# Patient Record
Sex: MALE | Race: WHITE | NOT HISPANIC OR LATINO | Employment: UNEMPLOYED | RURAL
[De-identification: names, ages, dates, MRNs, and addresses within clinical notes are randomized per-mention and may not be internally consistent; named-entity substitution may affect disease eponyms.]

---

## 2021-02-01 ENCOUNTER — OFFICE VISIT (OUTPATIENT)
Dept: FAMILY MEDICINE CLINIC | Facility: CLINIC | Age: 31
End: 2021-02-01

## 2021-02-01 VITALS
HEART RATE: 94 BPM | HEIGHT: 67 IN | TEMPERATURE: 98.4 F | DIASTOLIC BLOOD PRESSURE: 86 MMHG | WEIGHT: 143 LBS | OXYGEN SATURATION: 99 % | RESPIRATION RATE: 16 BRPM | SYSTOLIC BLOOD PRESSURE: 138 MMHG | BODY MASS INDEX: 22.44 KG/M2

## 2021-02-01 DIAGNOSIS — R60.9 EDEMA, UNSPECIFIED TYPE: Primary | ICD-10-CM

## 2021-02-01 PROCEDURE — 99203 OFFICE O/P NEW LOW 30 MIN: CPT | Performed by: FAMILY MEDICINE

## 2021-02-01 RX ORDER — ASPIRIN 325 MG
325 TABLET ORAL DAILY
COMMUNITY
End: 2022-10-11

## 2021-02-01 NOTE — PROGRESS NOTES
"Subjective   Faustino Almanzar is a 31 y.o. male.     Chief Complaint   Patient presents with   • Foot Swelling     x 1 week approx.        History of Present Illness     he is noting edema in feet and ankles for several days--its better now---deneis any cp or dyspnea      Current Outpatient Medications:   •  aspirin 325 MG tablet, Take 325 mg by mouth Daily., Disp: , Rfl:   Allergies   Allergen Reactions   • Sulfa Antibiotics Unknown - Low Severity       No past medical history on file.  Past Surgical History:   Procedure Laterality Date   • TONSILLECTOMY         Review of Systems   Constitutional: Negative.    HENT: Negative.    Eyes: Negative.    Respiratory: Negative.    Cardiovascular: Positive for leg swelling.   Gastrointestinal: Negative.    Endocrine: Negative.    Genitourinary: Negative.    Musculoskeletal: Negative.    Skin: Negative.    Allergic/Immunologic: Negative.    Neurological: Negative.    Hematological: Negative.    Psychiatric/Behavioral: Negative.        Objective  /86 (BP Location: Left arm)   Pulse 94   Temp 98.4 °F (36.9 °C)   Resp 16   Ht 170.2 cm (67\")   Wt 64.9 kg (143 lb)   SpO2 99%   BMI 22.40 kg/m²   Physical Exam  Vitals signs and nursing note reviewed.   Constitutional:       Appearance: Normal appearance. He is normal weight.   HENT:      Head: Normocephalic and atraumatic.      Nose: Nose normal.      Mouth/Throat:      Mouth: Mucous membranes are moist.      Pharynx: Oropharynx is clear.   Eyes:      Extraocular Movements: Extraocular movements intact.      Conjunctiva/sclera: Conjunctivae normal.      Pupils: Pupils are equal, round, and reactive to light.   Neck:      Musculoskeletal: Normal range of motion and neck supple.   Cardiovascular:      Rate and Rhythm: Normal rate and regular rhythm.      Pulses: Normal pulses.      Heart sounds: Normal heart sounds.   Pulmonary:      Effort: Pulmonary effort is normal.   Abdominal:      General: Abdomen is flat. Bowel " sounds are normal.   Musculoskeletal: Normal range of motion.   Skin:     General: Skin is warm.      Capillary Refill: Capillary refill takes less than 2 seconds.   Neurological:      General: No focal deficit present.      Mental Status: He is alert and oriented to person, place, and time. Mental status is at baseline.   Psychiatric:         Mood and Affect: Mood normal.         Behavior: Behavior normal.         Thought Content: Thought content normal.         Judgment: Judgment normal.         Assessment/Plan   Diagnoses and all orders for this visit:    1. Edema, unspecified type (Primary)  -     CBC w AUTO Differential  -     Comprehensive metabolic panel  -     TSH  -     Urinalysis without microscopic (no culture) - Urine, Clean Catch  -     Protein, Urine, 24 Hour - Urine, Clean Catch  -     Adult Transthoracic Echo Complete w/ Color, Spectral and Contrast if necessary per protocol  -     ECG 12 Lead                 Orders Placed This Encounter   Procedures   • Comprehensive metabolic panel   • TSH   • Urinalysis without microscopic (no culture) - Urine, Clean Catch   • Protein, Urine, 24 Hour - Urine, Clean Catch   • ECG 12 Lead     Order Specific Question:   Reason for Exam:     Answer:   edema, dyspnea   • Adult Transthoracic Echo Complete w/ Color, Spectral and Contrast if necessary per protocol     Order Specific Question:   Reason for exam?     Answer:   Heart Failure, Cardiomyopathy, or Sytemic or Pulmonary Hypertension   • CBC w AUTO Differential     Order Specific Question:   Manual Differential     Answer:   No       Follow up: 4 week(s)

## 2021-02-02 ENCOUNTER — APPOINTMENT (OUTPATIENT)
Dept: GENERAL RADIOLOGY | Facility: HOSPITAL | Age: 31
End: 2021-02-02

## 2021-02-02 ENCOUNTER — HOSPITAL ENCOUNTER (EMERGENCY)
Facility: HOSPITAL | Age: 31
Discharge: HOME | End: 2021-02-02
Admitting: EMERGENCY MEDICINE

## 2021-02-02 ENCOUNTER — TELEPHONE (OUTPATIENT)
Dept: FAMILY MEDICINE CLINIC | Facility: CLINIC | Age: 31
End: 2021-02-02

## 2021-02-02 VITALS
SYSTOLIC BLOOD PRESSURE: 146 MMHG | DIASTOLIC BLOOD PRESSURE: 85 MMHG | WEIGHT: 142 LBS | RESPIRATION RATE: 16 BRPM | HEART RATE: 72 BPM | OXYGEN SATURATION: 98 % | TEMPERATURE: 98.7 F | HEIGHT: 67 IN | BODY MASS INDEX: 22.29 KG/M2

## 2021-02-02 DIAGNOSIS — R80.9 PROTEINURIA, UNSPECIFIED TYPE: Primary | ICD-10-CM

## 2021-02-02 DIAGNOSIS — R31.9 HEMATURIA, UNSPECIFIED TYPE: Primary | ICD-10-CM

## 2021-02-02 DIAGNOSIS — R60.9 EDEMA, UNSPECIFIED TYPE: ICD-10-CM

## 2021-02-02 DIAGNOSIS — R60.0 PEDAL EDEMA: ICD-10-CM

## 2021-02-02 DIAGNOSIS — R80.9 PROTEINURIA, UNSPECIFIED TYPE: ICD-10-CM

## 2021-02-02 LAB
ALBUMIN SERPL-MCNC: 1.9 G/DL (ref 3.5–5.2)
ALBUMIN SERPL-MCNC: 2.4 G/DL (ref 3.5–5.2)
ALBUMIN/GLOB SERPL: 0.8 G/DL
ALBUMIN/GLOB SERPL: 1.1 G/DL
ALP SERPL-CCNC: 43 U/L (ref 39–117)
ALP SERPL-CCNC: 51 U/L (ref 39–117)
ALT SERPL W P-5'-P-CCNC: 10 U/L (ref 1–41)
ALT SERPL-CCNC: 16 U/L (ref 1–41)
ANION GAP SERPL CALCULATED.3IONS-SCNC: 10 MMOL/L (ref 5–15)
APPEARANCE UR: ABNORMAL
APTT PPP: 29.2 SECONDS (ref 24.1–35)
AST SERPL-CCNC: 18 U/L (ref 1–40)
AST SERPL-CCNC: 24 U/L (ref 1–40)
BACTERIA UR QL AUTO: ABNORMAL /HPF
BASOPHILS # BLD AUTO: 0.05 10*3/MM3 (ref 0–0.2)
BASOPHILS # BLD AUTO: 0.06 10*3/MM3 (ref 0–0.2)
BASOPHILS NFR BLD AUTO: 0.4 % (ref 0–1.5)
BASOPHILS NFR BLD AUTO: 0.4 % (ref 0–1.5)
BILIRUB SERPL-MCNC: 0.2 MG/DL (ref 0–1.2)
BILIRUB SERPL-MCNC: <0.2 MG/DL (ref 0–1.2)
BILIRUB UR QL STRIP: NEGATIVE
BILIRUB UR QL STRIP: NEGATIVE
BUN SERPL-MCNC: 10 MG/DL (ref 6–20)
BUN SERPL-MCNC: 10 MG/DL (ref 6–20)
BUN/CREAT SERPL: 13.7 (ref 7–25)
BUN/CREAT SERPL: 15.9 (ref 7–25)
CALCIUM SERPL-MCNC: 8.2 MG/DL (ref 8.6–10.5)
CALCIUM SPEC-SCNC: 7.4 MG/DL (ref 8.6–10.5)
CHLORIDE SERPL-SCNC: 103 MMOL/L (ref 98–107)
CHLORIDE SERPL-SCNC: 112 MMOL/L (ref 98–107)
CLARITY UR: CLEAR
CO2 SERPL-SCNC: 24 MMOL/L (ref 22–29)
CO2 SERPL-SCNC: 29.2 MMOL/L (ref 22–29)
COLOR UR: ABNORMAL
COLOR UR: YELLOW
CREAT SERPL-MCNC: 0.63 MG/DL (ref 0.76–1.27)
CREAT SERPL-MCNC: 0.73 MG/DL (ref 0.76–1.27)
DEPRECATED RDW RBC AUTO: 44.4 FL (ref 37–54)
EOSINOPHIL # BLD AUTO: 0.02 10*3/MM3 (ref 0–0.4)
EOSINOPHIL # BLD AUTO: 0.05 10*3/MM3 (ref 0–0.4)
EOSINOPHIL NFR BLD AUTO: 0.2 % (ref 0.3–6.2)
EOSINOPHIL NFR BLD AUTO: 0.4 % (ref 0.3–6.2)
ERYTHROCYTE [DISTWIDTH] IN BLOOD BY AUTOMATED COUNT: 12.9 % (ref 12.3–15.4)
ERYTHROCYTE [DISTWIDTH] IN BLOOD BY AUTOMATED COUNT: 13.2 % (ref 12.3–15.4)
GFR SERPL CREATININE-BSD FRML MDRD: 150 ML/MIN/1.73
GLOBULIN SER CALC-MCNC: 2.2 GM/DL
GLOBULIN UR ELPH-MCNC: 2.3 GM/DL
GLUCOSE BLDC GLUCOMTR-MCNC: 89 MG/DL (ref 70–130)
GLUCOSE SERPL-MCNC: 84 MG/DL (ref 65–99)
GLUCOSE SERPL-MCNC: 92 MG/DL (ref 65–99)
GLUCOSE UR QL: NEGATIVE
GLUCOSE UR STRIP-MCNC: NEGATIVE MG/DL
HCT VFR BLD AUTO: 45.9 % (ref 37.5–51)
HCT VFR BLD AUTO: 49.3 % (ref 37.5–51)
HGB BLD-MCNC: 16.1 G/DL (ref 13–17.7)
HGB BLD-MCNC: 16.9 G/DL (ref 13–17.7)
HGB UR QL STRIP.AUTO: ABNORMAL
HGB UR QL STRIP: ABNORMAL
HYALINE CASTS UR QL AUTO: ABNORMAL /LPF
IMM GRANULOCYTES # BLD AUTO: 0.06 10*3/MM3 (ref 0–0.05)
IMM GRANULOCYTES # BLD AUTO: 0.07 10*3/MM3 (ref 0–0.05)
IMM GRANULOCYTES NFR BLD AUTO: 0.5 % (ref 0–0.5)
IMM GRANULOCYTES NFR BLD AUTO: 0.5 % (ref 0–0.5)
INR PPP: 0.97 (ref 0.91–1.09)
KETONES UR QL STRIP: ABNORMAL
KETONES UR QL STRIP: ABNORMAL
LEUKOCYTE ESTERASE UR QL STRIP.AUTO: NEGATIVE
LEUKOCYTE ESTERASE UR QL STRIP: NEGATIVE
LIPASE SERPL-CCNC: 15 U/L (ref 13–60)
LYMPHOCYTES # BLD AUTO: 2.63 10*3/MM3 (ref 0.7–3.1)
LYMPHOCYTES # BLD AUTO: 2.68 10*3/MM3 (ref 0.7–3.1)
LYMPHOCYTES NFR BLD AUTO: 19.5 % (ref 19.6–45.3)
LYMPHOCYTES NFR BLD AUTO: 20.3 % (ref 19.6–45.3)
MCH RBC QN AUTO: 31.9 PG (ref 26.6–33)
MCH RBC QN AUTO: 31.9 PG (ref 26.6–33)
MCHC RBC AUTO-ENTMCNC: 34.3 G/DL (ref 31.5–35.7)
MCHC RBC AUTO-ENTMCNC: 35.1 G/DL (ref 31.5–35.7)
MCV RBC AUTO: 91.1 FL (ref 79–97)
MCV RBC AUTO: 93 FL (ref 79–97)
MONOCYTES # BLD AUTO: 0.71 10*3/MM3 (ref 0.1–0.9)
MONOCYTES # BLD AUTO: 0.87 10*3/MM3 (ref 0.1–0.9)
MONOCYTES NFR BLD AUTO: 5.3 % (ref 5–12)
MONOCYTES NFR BLD AUTO: 6.6 % (ref 5–12)
NEUTROPHILS # BLD AUTO: 9.99 10*3/MM3 (ref 1.7–7)
NEUTROPHILS NFR BLD AUTO: 72 % (ref 42.7–76)
NEUTROPHILS NFR BLD AUTO: 73.9 % (ref 42.7–76)
NEUTROPHILS NFR BLD AUTO: 9.53 10*3/MM3 (ref 1.7–7)
NITRITE UR QL STRIP: NEGATIVE
NITRITE UR QL STRIP: NEGATIVE
NRBC BLD AUTO-RTO: 0 /100 WBC (ref 0–0.2)
NRBC BLD AUTO-RTO: 0 /100 WBC (ref 0–0.2)
NT-PROBNP SERPL-MCNC: 26.1 PG/ML (ref 0–450)
PH UR STRIP.AUTO: 6.5 [PH] (ref 5–8)
PH UR STRIP: 6.5 [PH] (ref 5–8)
PLATELET # BLD AUTO: 295 10*3/MM3 (ref 140–450)
PLATELET # BLD AUTO: 334 10*3/MM3 (ref 140–450)
PMV BLD AUTO: 10.3 FL (ref 6–12)
POTASSIUM SERPL-SCNC: 4.2 MMOL/L (ref 3.5–5.2)
POTASSIUM SERPL-SCNC: 4.3 MMOL/L (ref 3.5–5.2)
PROT SERPL-MCNC: 4.2 G/DL (ref 6–8.5)
PROT SERPL-MCNC: 4.6 G/DL (ref 6–8.5)
PROT UR QL STRIP: ABNORMAL
PROT UR QL STRIP: ABNORMAL
PROTHROMBIN TIME: 12.5 SECONDS (ref 11.9–14.6)
RBC # BLD AUTO: 5.04 10*6/MM3 (ref 4.14–5.8)
RBC # BLD AUTO: 5.3 10*6/MM3 (ref 4.14–5.8)
RBC # UR: ABNORMAL /HPF
REF LAB TEST METHOD: ABNORMAL
SODIUM SERPL-SCNC: 140 MMOL/L (ref 136–145)
SODIUM SERPL-SCNC: 146 MMOL/L (ref 136–145)
SP GR UR STRIP: 1.02 (ref 1–1.03)
SP GR UR: ABNORMAL (ref 1–1.03)
SQUAMOUS #/AREA URNS HPF: ABNORMAL /HPF
TSH SERPL DL<=0.005 MIU/L-ACNC: 2.48 UIU/ML (ref 0.27–4.2)
UROBILINOGEN UR QL STRIP: ABNORMAL
UROBILINOGEN UR STRIP-MCNC: ABNORMAL MG/DL
WBC # BLD AUTO: 13.21 10*3/MM3 (ref 3.4–10.8)
WBC # BLD AUTO: 13.51 10*3/MM3 (ref 3.4–10.8)
WBC UR QL AUTO: ABNORMAL /HPF
YEAST URNS QL MICRO: ABNORMAL /HPF

## 2021-02-02 PROCEDURE — 81001 URINALYSIS AUTO W/SCOPE: CPT | Performed by: NURSE PRACTITIONER

## 2021-02-02 PROCEDURE — 83690 ASSAY OF LIPASE: CPT | Performed by: NURSE PRACTITIONER

## 2021-02-02 PROCEDURE — 83880 ASSAY OF NATRIURETIC PEPTIDE: CPT | Performed by: NURSE PRACTITIONER

## 2021-02-02 PROCEDURE — 80053 COMPREHEN METABOLIC PANEL: CPT | Performed by: NURSE PRACTITIONER

## 2021-02-02 PROCEDURE — 93010 ELECTROCARDIOGRAM REPORT: CPT | Performed by: INTERNAL MEDICINE

## 2021-02-02 PROCEDURE — 85730 THROMBOPLASTIN TIME PARTIAL: CPT | Performed by: NURSE PRACTITIONER

## 2021-02-02 PROCEDURE — 71045 X-RAY EXAM CHEST 1 VIEW: CPT

## 2021-02-02 PROCEDURE — 93005 ELECTROCARDIOGRAM TRACING: CPT | Performed by: NURSE PRACTITIONER

## 2021-02-02 PROCEDURE — 99283 EMERGENCY DEPT VISIT LOW MDM: CPT

## 2021-02-02 PROCEDURE — 85025 COMPLETE CBC W/AUTO DIFF WBC: CPT | Performed by: NURSE PRACTITIONER

## 2021-02-02 PROCEDURE — 85610 PROTHROMBIN TIME: CPT | Performed by: NURSE PRACTITIONER

## 2021-02-02 PROCEDURE — 82962 GLUCOSE BLOOD TEST: CPT

## 2021-02-02 RX ORDER — ONDANSETRON 4 MG/1
4 TABLET, ORALLY DISINTEGRATING ORAL EVERY 6 HOURS PRN
Qty: 12 TABLET | Refills: 0 | Status: SHIPPED | OUTPATIENT
Start: 2021-02-02 | End: 2021-02-05

## 2021-02-02 RX ORDER — SODIUM CHLORIDE 0.9 % (FLUSH) 0.9 %
10 SYRINGE (ML) INJECTION AS NEEDED
Status: DISCONTINUED | OUTPATIENT
Start: 2021-02-02 | End: 2021-02-02 | Stop reason: HOSPADM

## 2021-02-02 RX ADMIN — SODIUM CHLORIDE 1000 ML: 9 INJECTION, SOLUTION INTRAVENOUS at 13:45

## 2021-02-02 NOTE — DISCHARGE INSTRUCTIONS
Encourage fluid.  Limit sodium as previously instructed.  Tylenol as needed for pain. ESTHER hose when up.  Elevate legs when possible.  Follow up with PCP - call for appointment. Follow up with nephrology when scheduled. Return to ED if condition does not improve or worsens

## 2021-02-02 NOTE — ED PROVIDER NOTES
Subjective   31 yom presents with swelling to his feet bilaterally.   He states he was has been having swelling for a few days.  He denies SOB or CP.  He denies fever or cough.  He states he was seen by his PCP yesterday who did lab work and a urinalysis.  He states he was told he had blood and protein in his urine.  He states he feels like he drinks lots of water but still feels thirsty. He states he has had some nausea and no appetite.  He states his PCP told him to limit his sodium and they would refer him to a nephrologist.  He denies abdomen pain.  He denies v/d.  He denies dysuria.            Review of Systems   Constitutional: Negative for activity change, appetite change, fatigue and fever.   HENT: Negative for congestion, ear pain, facial swelling and sore throat.    Eyes: Negative for discharge and visual disturbance.   Respiratory: Negative for apnea, chest tightness, shortness of breath, wheezing and stridor.    Cardiovascular: Positive for leg swelling. Negative for chest pain and palpitations.   Gastrointestinal: Negative for abdominal distention, abdominal pain, diarrhea, nausea and vomiting.   Genitourinary: Negative for difficulty urinating and dysuria.   Musculoskeletal: Negative for arthralgias and myalgias.   Skin: Negative for rash and wound.   Neurological: Negative for dizziness and seizures.   Psychiatric/Behavioral: Negative for agitation and confusion.       Past Medical History:   Diagnosis Date   • Kidney stone        Allergies   Allergen Reactions   • Sulfa Antibiotics Unknown - Low Severity       Past Surgical History:   Procedure Laterality Date   • TONSILLECTOMY         Family History   Problem Relation Age of Onset   • Diabetes Mother    • Heart disease Father    • Diabetes Paternal Uncle        Social History     Socioeconomic History   • Marital status: Single     Spouse name: Not on file   • Number of children: Not on file   • Years of education: Not on file   • Highest education  "level: Not on file   Tobacco Use   • Smoking status: Former Smoker     Types: Cigarettes   • Tobacco comment: stopped 3 weeks ago.    Substance and Sexual Activity   • Alcohol use: Not Currently     Comment: occasional   • Drug use: Yes     Types: Marijuana     Comment: last use last week           Objective   Physical Exam  Constitutional:       Appearance: He is well-developed.   HENT:      Head: Normocephalic.   Eyes:      Pupils: Pupils are equal, round, and reactive to light.   Neck:      Musculoskeletal: Normal range of motion and neck supple.   Cardiovascular:      Rate and Rhythm: Normal rate and regular rhythm.      Heart sounds: No murmur.   Pulmonary:      Effort: Pulmonary effort is normal.      Breath sounds: Normal breath sounds.   Abdominal:      General: Bowel sounds are normal.      Palpations: Abdomen is soft.   Musculoskeletal: Normal range of motion.      Right lower le+ Edema present.      Left lower le+ Edema present.   Skin:     General: Skin is warm and dry.   Neurological:      Mental Status: He is alert and oriented to person, place, and time.         Procedures          No current facility-administered medications for this encounter.     Current Outpatient Medications:   •  aspirin 325 MG tablet, Take 325 mg by mouth Daily., Disp: , Rfl:   •  ondansetron ODT (ZOFRAN-ODT) 4 MG disintegrating tablet, Place 1 tablet on the tongue Every 6 (Six) Hours As Needed for Nausea or Vomiting for up to 3 days., Disp: 12 tablet, Rfl: 0    Vital signs:  /85 (BP Location: Left arm)   Pulse 72   Temp 98.7 °F (37.1 °C) (Oral)   Resp 16   Ht 170.2 cm (67\")   Wt 64.4 kg (142 lb)   SpO2 98%   BMI 22.24 kg/m²        ED LAB RESULTS:   Lab Results (last 24 hours)     Procedure Component Value Units Date/Time    CBC & Differential [[id number]]  (Abnormal) Collected: 21 142    Specimen: Blood Updated: 21 1443    Narrative:      The following orders were created for panel order CBC & " Differential.  Procedure                               Abnormality         Status                     ---------                               -----------         ------                     CBC Auto Differential[[id number]]         Abnormal            Final result                 Please view results for these tests on the individual orders.    Comprehensive Metabolic Panel [[id number]]  (Abnormal) Collected: 02/02/21 1429    Specimen: Blood Updated: 02/02/21 1501     Glucose 84 mg/dL      BUN 10 mg/dL      Creatinine 0.63 mg/dL      Sodium 146 mmol/L      Potassium 4.2 mmol/L      Chloride 112 mmol/L      CO2 24.0 mmol/L      Calcium 7.4 mg/dL      Total Protein 4.2 g/dL      Albumin 1.90 g/dL      ALT (SGPT) 10 U/L      AST (SGOT) 18 U/L      Alkaline Phosphatase 43 U/L      Total Bilirubin 0.2 mg/dL      eGFR Non African Amer 150 mL/min/1.73      Globulin 2.3 gm/dL      A/G Ratio 0.8 g/dL      BUN/Creatinine Ratio 15.9     Anion Gap 10.0 mmol/L     Narrative:      GFR Normal >60  Chronic Kidney Disease <60  Kidney Failure <15      Protime-INR [[id number]]  (Normal) Collected: 02/02/21 1429    Specimen: Blood Updated: 02/02/21 1457     Protime 12.5 Seconds      INR 0.97    aPTT [[id number]]  (Normal) Collected: 02/02/21 1429    Specimen: Blood Updated: 02/02/21 1457     PTT 29.2 seconds     Lipase [[id number]]  (Normal) Collected: 02/02/21 1429    Specimen: Blood Updated: 02/02/21 1456     Lipase 15 U/L     CBC Auto Differential [[id number]]  (Abnormal) Collected: 02/02/21 1429    Specimen: Blood Updated: 02/02/21 1443     WBC 13.21 10*3/mm3      RBC 5.04 10*6/mm3      Hemoglobin 16.1 g/dL      Hematocrit 45.9 %      MCV 91.1 fL      MCH 31.9 pg      MCHC 35.1 g/dL      RDW 13.2 %      RDW-SD 44.4 fl      MPV 10.3 fL      Platelets 295 10*3/mm3      Neutrophil % 72.0 %      Lymphocyte % 20.3 %      Monocyte % 6.6 %      Eosinophil % 0.2 %      Basophil % 0.4 %      Immature Grans % 0.5 %      Neutrophils, Absolute 9.53 10*3/mm3       Lymphocytes, Absolute 2.68 10*3/mm3      Monocytes, Absolute 0.87 10*3/mm3      Eosinophils, Absolute 0.02 10*3/mm3      Basophils, Absolute 0.05 10*3/mm3      Immature Grans, Absolute 0.06 10*3/mm3      nRBC 0.0 /100 WBC     BNP [[id number]]  (Normal) Collected: 02/02/21 1429    Specimen: Blood Updated: 02/02/21 1513     proBNP 26.1 pg/mL     Narrative:      Among patients with dyspnea, NT-proBNP is highly sensitive for the detection of acute congestive heart failure. In addition NT-proBNP of <300 pg/ml effectively rules out acute congestive heart failure with 99% negative predictive value.    Results may be falsely decreased if patient taking Biotin.      Urinalysis With Culture If Indicated - Urine, Clean Catch [[id number]]  (Abnormal) Collected: 02/02/21 1604    Specimen: Urine, Clean Catch Updated: 02/02/21 1626     Color, UA Yellow     Appearance, UA Clear     pH, UA 6.5     Specific Gravity, UA 1.018     Glucose, UA Negative     Ketones, UA 40 mg/dL (2+)     Bilirubin, UA Negative     Blood, UA Large (3+)     Protein, UA >=300 mg/dL (3+)     Leuk Esterase, UA Negative     Nitrite, UA Negative     Urobilinogen, UA 0.2 E.U./dL    Urinalysis, Microscopic Only - Urine, Clean Catch [[id number]]  (Abnormal) Collected: 02/02/21 1604    Specimen: Urine, Clean Catch Updated: 02/02/21 1642     RBC, UA 6-12 /HPF      WBC, UA 3-5 /HPF      Bacteria, UA Trace /HPF      Squamous Epithelial Cells, UA 0-2 /HPF      Yeast, UA Small/1+ Budding Yeast /HPF      Hyaline Casts, UA 3-6 /LPF      Methodology Manual Light Microscopy             IMAGING RESULTS  XR Chest 1 View   ED Interpretation   See results below      Final Result   1. No acute cardiopulmonary process.       This report was finalized on 02/02/2021 14:06 by Dr. Davis Levin MD.                     ED Course  ED Course as of Feb 03 1337   Tue Feb 02, 2021   1740 I discussed the patient's history, assessment and testing results with Dr So.  He will be dc'd home  to f/u with PCP and nephrology.  The patient voiced understanding of d'c instructions as well as testing results.     [KS]      ED Course User Index  [KS] Luan Torres APRN                                           MDM  Number of Diagnoses or Management Options  Pedal edema: new and requires workup  Proteinuria, unspecified type: new and requires workup     Amount and/or Complexity of Data Reviewed  Clinical lab tests: ordered and reviewed  Tests in the radiology section of CPT®: ordered and reviewed  Decide to obtain previous medical records or to obtain history from someone other than the patient: yes  Discuss the patient with other providers: yes  Independent visualization of images, tracings, or specimens: yes    Risk of Complications, Morbidity, and/or Mortality  Presenting problems: minimal  Diagnostic procedures: minimal  Management options: minimal    Patient Progress  Patient progress: stable      Final diagnoses:   Proteinuria, unspecified type   Pedal edema            Luan Torres, [facility]  02/03/21 0083

## 2021-02-03 LAB
QT INTERVAL: 368 MS
QTC INTERVAL: 440 MS

## 2021-02-05 ENCOUNTER — OFFICE VISIT (OUTPATIENT)
Dept: FAMILY MEDICINE CLINIC | Facility: CLINIC | Age: 31
End: 2021-02-05

## 2021-02-05 VITALS
TEMPERATURE: 98.5 F | HEIGHT: 67 IN | SYSTOLIC BLOOD PRESSURE: 138 MMHG | RESPIRATION RATE: 16 BRPM | WEIGHT: 143 LBS | DIASTOLIC BLOOD PRESSURE: 80 MMHG | HEART RATE: 70 BPM | BODY MASS INDEX: 22.44 KG/M2

## 2021-02-05 DIAGNOSIS — R60.9 EDEMA, UNSPECIFIED TYPE: Primary | ICD-10-CM

## 2021-02-05 DIAGNOSIS — R31.1 BENIGN ESSENTIAL MICROSCOPIC HEMATURIA: ICD-10-CM

## 2021-02-05 PROCEDURE — 99213 OFFICE O/P EST LOW 20 MIN: CPT | Performed by: FAMILY MEDICINE

## 2021-02-05 NOTE — PROGRESS NOTES
"Subjective   Faustino Almanzar is a 31 y.o. male.     Chief Complaint   Patient presents with   • Follow-up     hospital er        History of Present Illness     his edema is better---denies any sob or  cp--he awaiting the appt with nephrolgy      Current Outpatient Medications:   •  aspirin 325 MG tablet, Take 325 mg by mouth Daily., Disp: , Rfl:   •  ondansetron ODT (ZOFRAN-ODT) 4 MG disintegrating tablet, Place 1 tablet on the tongue Every 6 (Six) Hours As Needed for Nausea or Vomiting for up to 3 days., Disp: 12 tablet, Rfl: 0  Allergies   Allergen Reactions   • Sulfa Antibiotics Unknown - Low Severity       Past Medical History:   Diagnosis Date   • Kidney stone      Past Surgical History:   Procedure Laterality Date   • TONSILLECTOMY         Review of Systems   Constitutional: Negative.    HENT: Negative.    Eyes: Negative.    Respiratory: Negative.    Cardiovascular: Positive for leg swelling.   Gastrointestinal: Negative.    Endocrine: Negative.    Genitourinary: Negative.    Musculoskeletal: Negative.    Skin: Negative.    Allergic/Immunologic: Negative.    Neurological: Negative.    Hematological: Negative.    Psychiatric/Behavioral: Negative.        Objective  /80   Pulse 70   Temp 98.5 °F (36.9 °C)   Resp 16   Ht 170.2 cm (67.01\")   Wt 64.9 kg (143 lb)   BMI 22.39 kg/m²   Physical Exam  Vitals signs and nursing note reviewed.   Constitutional:       Appearance: He is normal weight.   HENT:      Head: Normocephalic and atraumatic.      Right Ear: Ear canal normal.      Left Ear: Ear canal normal.      Mouth/Throat:      Mouth: Mucous membranes are moist.   Eyes:      Conjunctiva/sclera: Conjunctivae normal.      Pupils: Pupils are equal, round, and reactive to light.   Neck:      Musculoskeletal: Normal range of motion and neck supple.   Cardiovascular:      Rate and Rhythm: Normal rate and regular rhythm.      Pulses: Normal pulses.      Heart sounds: Normal heart sounds.   Pulmonary:      " Effort: Pulmonary effort is normal.      Breath sounds: Normal breath sounds.   Abdominal:      General: Abdomen is flat. Bowel sounds are normal.      Palpations: Abdomen is soft.   Musculoskeletal: Normal range of motion.   Skin:     General: Skin is warm.      Capillary Refill: Capillary refill takes less than 2 seconds.   Neurological:      General: No focal deficit present.      Mental Status: He is alert and oriented to person, place, and time. Mental status is at baseline.   Psychiatric:         Mood and Affect: Mood normal.         Behavior: Behavior normal.         Thought Content: Thought content normal.         Judgment: Judgment normal.         Assessment/Plan   Diagnoses and all orders for this visit:    1. Edema, unspecified type (Primary)    2. Benign essential microscopic hematuria      He awaiting to see nephrology           No orders of the defined types were placed in this encounter.      Follow up: 3 month(s)

## 2021-02-06 LAB
PROT 24H UR-MRATE: ABNORMAL MG/24HOURS (ref 0–150)
PROT UR-MCNC: 1540 MG/DL

## 2021-03-01 ENCOUNTER — OFFICE VISIT (OUTPATIENT)
Dept: FAMILY MEDICINE CLINIC | Facility: CLINIC | Age: 31
End: 2021-03-01

## 2021-03-01 VITALS
TEMPERATURE: 98 F | OXYGEN SATURATION: 98 % | SYSTOLIC BLOOD PRESSURE: 124 MMHG | RESPIRATION RATE: 16 BRPM | DIASTOLIC BLOOD PRESSURE: 78 MMHG | HEART RATE: 78 BPM | HEIGHT: 67 IN | WEIGHT: 152 LBS | BODY MASS INDEX: 23.86 KG/M2

## 2021-03-01 DIAGNOSIS — R60.9 EDEMA, UNSPECIFIED TYPE: Primary | ICD-10-CM

## 2021-03-01 DIAGNOSIS — R80.9 PROTEINURIA, UNSPECIFIED TYPE: ICD-10-CM

## 2021-03-01 PROCEDURE — 99213 OFFICE O/P EST LOW 20 MIN: CPT | Performed by: FAMILY MEDICINE

## 2021-03-01 RX ORDER — LOSARTAN POTASSIUM 50 MG/1
TABLET ORAL
COMMUNITY
Start: 2021-02-10

## 2021-03-01 NOTE — PROGRESS NOTES
"Subjective   Faustino Almanzar is a 31 y.o. male.     Chief Complaint   Patient presents with   • Hypertension   • Leg Swelling       History of Present Illness     he is undergong eval for proteinuria by renal service      Current Outpatient Medications:   •  aspirin 325 MG tablet, Take 325 mg by mouth Daily., Disp: , Rfl:   •  losartan (COZAAR) 50 MG tablet, , Disp: , Rfl:   Allergies   Allergen Reactions   • Sulfa Antibiotics Unknown - Low Severity       Past Medical History:   Diagnosis Date   • Kidney stone      Past Surgical History:   Procedure Laterality Date   • TONSILLECTOMY         Review of Systems   Constitutional: Negative.    HENT: Negative.    Eyes: Negative.    Respiratory: Negative.    Cardiovascular: Negative.    Gastrointestinal: Negative.    Endocrine: Negative.    Genitourinary: Negative.    Musculoskeletal: Negative.    Skin: Negative.    Allergic/Immunologic: Negative.    Neurological: Negative.    Hematological: Negative.    Psychiatric/Behavioral: Negative.        Objective  /78 (BP Location: Left arm)   Pulse 78   Temp 98 °F (36.7 °C)   Resp 16   Ht 170.2 cm (67\")   Wt 68.9 kg (152 lb)   SpO2 98%   BMI 23.81 kg/m²   Physical Exam  Vitals signs and nursing note reviewed.   Constitutional:       Appearance: He is normal weight.   HENT:      Head: Normocephalic and atraumatic.      Nose: Nose normal.      Mouth/Throat:      Mouth: Mucous membranes are moist.   Eyes:      Extraocular Movements: Extraocular movements intact.      Pupils: Pupils are equal, round, and reactive to light.   Neck:      Musculoskeletal: Normal range of motion.   Cardiovascular:      Rate and Rhythm: Normal rate and regular rhythm.      Pulses: Normal pulses.   Pulmonary:      Effort: Pulmonary effort is normal.      Breath sounds: Normal breath sounds.   Abdominal:      General: Abdomen is flat. Bowel sounds are normal.   Musculoskeletal: Normal range of motion.   Skin:     General: Skin is warm and " dry.      Capillary Refill: Capillary refill takes less than 2 seconds.   Neurological:      General: No focal deficit present.      Mental Status: He is alert and oriented to person, place, and time. Mental status is at baseline.   Psychiatric:         Mood and Affect: Mood normal.         Behavior: Behavior normal.         Thought Content: Thought content normal.         Judgment: Judgment normal.         Assessment/Plan   Diagnoses and all orders for this visit:    1. Edema, unspecified type (Primary)    2. Proteinuria, unspecified type        He will keep appt with nephrology         No orders of the defined types were placed in this encounter.      Follow up: 6 month(s)

## 2021-03-01 NOTE — PATIENT INSTRUCTIONS
Problem List: Reviewed (You)  Medical History: Reviewed (You)  Surgical History: Reviewed (You)  Tobacco History: Reviewed (You)  Family History: Reviewed (You)      Patient Instructions (F3 to enlarge) Go to Clinical References

## 2021-03-04 ENCOUNTER — HOSPITAL ENCOUNTER (OUTPATIENT)
Dept: CARDIOLOGY | Facility: HOSPITAL | Age: 31
Discharge: HOME | End: 2021-03-04

## 2021-03-04 ENCOUNTER — HOSPITAL ENCOUNTER (OUTPATIENT)
Dept: ULTRASOUND IMAGING | Facility: HOSPITAL | Age: 31
Discharge: HOME | End: 2021-03-04

## 2021-03-04 VITALS
SYSTOLIC BLOOD PRESSURE: 129 MMHG | BODY MASS INDEX: 23.84 KG/M2 | HEIGHT: 67 IN | DIASTOLIC BLOOD PRESSURE: 72 MMHG | WEIGHT: 151.9 LBS

## 2021-03-04 PROCEDURE — 93306 TTE W/DOPPLER COMPLETE: CPT | Performed by: INTERNAL MEDICINE

## 2021-03-04 PROCEDURE — 93306 TTE W/DOPPLER COMPLETE: CPT

## 2021-03-04 PROCEDURE — 76775 US EXAM ABDO BACK WALL LIM: CPT

## 2021-03-08 LAB
BH CV ECHO MEAS - % IVS THICK: 31.6 %
BH CV ECHO MEAS - % LVPW THICK: 64.9 %
BH CV ECHO MEAS - AO ACC TIME: 2.2 SEC
BH CV ECHO MEAS - AO MAX PG (FULL): 0.3 MMHG
BH CV ECHO MEAS - AO MAX PG: 0.84 MMHG
BH CV ECHO MEAS - AO V2 MAX: 45.8 CM/SEC
BH CV ECHO MEAS - AO V2 VTI: 34.5 CM
BH CV ECHO MEAS - BSA(HAYCOCK): 1.8 M^2
BH CV ECHO MEAS - BSA: 1.8 M^2
BH CV ECHO MEAS - BZI_BMI: 23.8 KILOGRAMS/M^2
BH CV ECHO MEAS - BZI_METRIC_HEIGHT: 170.2 CM
BH CV ECHO MEAS - BZI_METRIC_WEIGHT: 68.9 KG
BH CV ECHO MEAS - EDV(MOD-SP4): 102.9 ML
BH CV ECHO MEAS - EF(MOD-SP4): 46 %
BH CV ECHO MEAS - ESV(MOD-SP4): 55.6 ML
BH CV ECHO MEAS - IVS/LVPW: 0.95
BH CV ECHO MEAS - IVSD: 0.86 CM
BH CV ECHO MEAS - IVSS: 1.1 CM
BH CV ECHO MEAS - LAT PEAK E' VEL: 16.8 CM/SEC
BH CV ECHO MEAS - LV DIASTOLIC VOL/BSA (35-75): 57.2 ML/M^2
BH CV ECHO MEAS - LV MAX PG: 0.54 MMHG
BH CV ECHO MEAS - LV MEAN PG: 4.8 MMHG
BH CV ECHO MEAS - LV SYSTOLIC VOL/BSA (12-30): 30.9 ML/M^2
BH CV ECHO MEAS - LV V1 MAX: 36.7 CM/SEC
BH CV ECHO MEAS - LV V1 MEAN: 104 CM/SEC
BH CV ECHO MEAS - LV V1 VTI: 30.2 CM
BH CV ECHO MEAS - LVD MAJOR: 8 CM
BH CV ECHO MEAS - LVET: 0.31 SEC
BH CV ECHO MEAS - LVIDD: 4.6 CM
BH CV ECHO MEAS - LVIDS: 3.5 CM
BH CV ECHO MEAS - LVOT AREA (M): 2.6 CM^2
BH CV ECHO MEAS - LVPWD: 0.91 CM
BH CV ECHO MEAS - LVPWS: 1.5 CM
BH CV ECHO MEAS - LVS MAJOR: 6.5 CM
BH CV ECHO MEAS - MED PEAK E' VEL: 14.2 CM/SEC
BH CV ECHO MEAS - MV A MAX VEL: 46 CM/SEC
BH CV ECHO MEAS - MV DEC SLOPE: 668.5 CM/SEC^2
BH CV ECHO MEAS - MV DEC TIME: 186 MSEC
BH CV ECHO MEAS - MV E MAX VEL: 124.7 CM/SEC
BH CV ECHO MEAS - MV E/A: 2.7
BH CV ECHO MEAS - SI(MOD-SP4): 26.3 ML/M^2
BH CV ECHO MEAS - SV(MOD-SP4): 47.3 ML
BH CV ECHO MEASUREMENTS AVERAGE E/E' RATIO: 8.05
LEFT ATRIUM VOLUME INDEX: 29.8 ML/M2
LEFT ATRIUM VOLUME: 53.6 CM3
MAXIMAL PREDICTED HEART RATE: 190 BPM
STRESS TARGET HR: 162 BPM

## 2021-08-11 LAB
ANION GAP SERPL CALCULATED.3IONS-SCNC: 12 MMOL/L (ref 7–19)
BUN BLDV-MCNC: 19 MG/DL (ref 6–20)
CALCIUM SERPL-MCNC: 8.4 MG/DL (ref 8.6–10)
CHLORIDE BLD-SCNC: 90 MMOL/L (ref 98–111)
CO2: 31 MMOL/L (ref 22–29)
CREAT SERPL-MCNC: 0.9 MG/DL (ref 0.5–1.2)
CREATININE URINE: 118.9 MG/DL (ref 4.2–622)
GFR AFRICAN AMERICAN: >59
GFR NON-AFRICAN AMERICAN: >60
GLUCOSE BLD-MCNC: 108 MG/DL (ref 74–109)
POTASSIUM SERPL-SCNC: 3.1 MMOL/L (ref 3.5–5)
PROTEIN PROTEIN: >600 MG/DL (ref 15–45)
SODIUM BLD-SCNC: 133 MMOL/L (ref 136–145)

## 2021-08-30 ENCOUNTER — OFFICE VISIT (OUTPATIENT)
Dept: FAMILY MEDICINE CLINIC | Facility: CLINIC | Age: 31
End: 2021-08-30

## 2021-08-30 VITALS
OXYGEN SATURATION: 97 % | HEART RATE: 94 BPM | DIASTOLIC BLOOD PRESSURE: 62 MMHG | HEIGHT: 67 IN | BODY MASS INDEX: 23.54 KG/M2 | WEIGHT: 150 LBS | SYSTOLIC BLOOD PRESSURE: 112 MMHG | RESPIRATION RATE: 16 BRPM

## 2021-08-30 DIAGNOSIS — N28.9 NEPHROPATHY: Primary | ICD-10-CM

## 2021-08-30 PROCEDURE — 99213 OFFICE O/P EST LOW 20 MIN: CPT | Performed by: FAMILY MEDICINE

## 2021-08-30 RX ORDER — BUMETANIDE 1 MG/1
TABLET ORAL
COMMUNITY
Start: 2021-07-02

## 2021-08-30 RX ORDER — PREDNISONE 20 MG/1
TABLET ORAL
COMMUNITY
Start: 2021-08-11 | End: 2022-08-23

## 2021-08-30 RX ORDER — CYCLOSPORINE 100 MG/1
CAPSULE, LIQUID FILLED ORAL
COMMUNITY
Start: 2021-07-20 | End: 2022-10-11

## 2021-08-30 RX ORDER — CYCLOSPORINE 50 MG/1
CAPSULE, LIQUID FILLED ORAL
COMMUNITY
Start: 2021-07-16 | End: 2022-10-11

## 2021-08-30 NOTE — PROGRESS NOTES
"Subjective   Faustino Almanzar is a 31 y.o. male.     Chief Complaint   Patient presents with   • Edema     6 mo f/u      History of Present Illness     he is under the care of nephrology for nephropathy      Current Outpatient Medications:   •  aspirin 325 MG tablet, Take 325 mg by mouth Daily., Disp: , Rfl:   •  bumetanide (BUMEX) 1 MG tablet, , Disp: , Rfl:   •  cycloSPORINE modified (NEORAL) 100 MG capsule, , Disp: , Rfl:   •  cycloSPORINE modified (NEORAL) 50 MG capsule, , Disp: , Rfl:   •  losartan (COZAAR) 50 MG tablet, , Disp: , Rfl:   •  predniSONE (DELTASONE) 20 MG tablet, , Disp: , Rfl:   Allergies   Allergen Reactions   • Sulfa Antibiotics Unknown - Low Severity       Past Medical History:   Diagnosis Date   • Kidney stone      Past Surgical History:   Procedure Laterality Date   • TONSILLECTOMY         Review of Systems   Constitutional: Negative.    HENT: Negative.    Eyes: Negative.    Respiratory: Negative.    Cardiovascular: Negative.    Gastrointestinal: Negative.    Endocrine: Negative.    Genitourinary: Negative.    Musculoskeletal: Negative.    Skin: Negative.    Allergic/Immunologic: Negative.    Neurological: Negative.    Hematological: Negative.    Psychiatric/Behavioral: Negative.        Objective  /62   Pulse 94   Resp 16   Ht 170.2 cm (67\")   Wt 68 kg (150 lb)   SpO2 97%   BMI 23.49 kg/m²   Physical Exam  Vitals and nursing note reviewed.   Constitutional:       Appearance: Normal appearance. He is normal weight.   HENT:      Head: Normocephalic.      Nose: Nose normal.      Mouth/Throat:      Mouth: Mucous membranes are moist.   Eyes:      Extraocular Movements: Extraocular movements intact.      Pupils: Pupils are equal, round, and reactive to light.   Cardiovascular:      Rate and Rhythm: Normal rate and regular rhythm.      Pulses: Normal pulses.      Heart sounds: Normal heart sounds.   Pulmonary:      Effort: Pulmonary effort is normal.      Breath sounds: Normal breath " sounds.   Abdominal:      General: Abdomen is flat. Bowel sounds are normal.   Musculoskeletal:         General: Normal range of motion.      Cervical back: Normal range of motion.   Skin:     General: Skin is warm and dry.      Capillary Refill: Capillary refill takes less than 2 seconds.   Neurological:      General: No focal deficit present.      Mental Status: He is alert and oriented to person, place, and time. Mental status is at baseline.   Psychiatric:         Mood and Affect: Mood normal.         Behavior: Behavior normal.         Thought Content: Thought content normal.         Judgment: Judgment normal.         Assessment/Plan   Diagnoses and all orders for this visit:    1. Nephropathy (Primary)      He will continue follow up with nephrology           No orders of the defined types were placed in this encounter.      Follow up: 6 month(s)

## 2021-09-07 LAB
ALBUMIN SERPL-MCNC: 3.8 G/DL (ref 3.5–5.2)
ALP BLD-CCNC: 63 U/L (ref 40–130)
ALT SERPL-CCNC: 25 U/L (ref 5–41)
ANION GAP SERPL CALCULATED.3IONS-SCNC: 12 MMOL/L (ref 7–19)
AST SERPL-CCNC: 13 U/L (ref 5–40)
BILIRUB SERPL-MCNC: 0.3 MG/DL (ref 0.2–1.2)
BUN BLDV-MCNC: 25 MG/DL (ref 6–20)
CALCIUM SERPL-MCNC: 10 MG/DL (ref 8.6–10)
CHLORIDE BLD-SCNC: 96 MMOL/L (ref 98–111)
CO2: 32 MMOL/L (ref 22–29)
CREAT SERPL-MCNC: 0.9 MG/DL (ref 0.5–1.2)
CREATININE URINE: 210.7 MG/DL (ref 4.2–622)
GFR AFRICAN AMERICAN: >59
GFR NON-AFRICAN AMERICAN: >60
GLUCOSE BLD-MCNC: 134 MG/DL (ref 74–109)
POTASSIUM SERPL-SCNC: 4.1 MMOL/L (ref 3.5–5)
PROTEIN PROTEIN: 354 MG/DL (ref 15–45)
SODIUM BLD-SCNC: 140 MMOL/L (ref 136–145)
TOTAL PROTEIN: 6.2 G/DL (ref 6.6–8.7)

## 2021-09-30 ENCOUNTER — OFFICE VISIT (OUTPATIENT)
Dept: FAMILY MEDICINE CLINIC | Facility: CLINIC | Age: 31
End: 2021-09-30

## 2021-09-30 VITALS
HEIGHT: 67 IN | TEMPERATURE: 97.9 F | OXYGEN SATURATION: 98 % | SYSTOLIC BLOOD PRESSURE: 138 MMHG | RESPIRATION RATE: 16 BRPM | WEIGHT: 152.8 LBS | HEART RATE: 81 BPM | BODY MASS INDEX: 23.98 KG/M2 | DIASTOLIC BLOOD PRESSURE: 86 MMHG

## 2021-09-30 DIAGNOSIS — H66.91 OTITIS OF RIGHT EAR: Primary | ICD-10-CM

## 2021-09-30 DIAGNOSIS — H60.392 OTHER INFECTIVE ACUTE OTITIS EXTERNA OF LEFT EAR: ICD-10-CM

## 2021-09-30 PROCEDURE — 99213 OFFICE O/P EST LOW 20 MIN: CPT | Performed by: NURSE PRACTITIONER

## 2021-09-30 RX ORDER — AMOXICILLIN 500 MG/1
500 TABLET, FILM COATED ORAL 3 TIMES DAILY
Qty: 30 TABLET | Refills: 0 | Status: SHIPPED | OUTPATIENT
Start: 2021-09-30 | End: 2021-10-10

## 2022-08-23 ENCOUNTER — OFFICE VISIT (OUTPATIENT)
Dept: FAMILY MEDICINE CLINIC | Facility: CLINIC | Age: 32
End: 2022-08-23

## 2022-08-23 VITALS
OXYGEN SATURATION: 96 % | HEIGHT: 67 IN | TEMPERATURE: 97.8 F | RESPIRATION RATE: 14 BRPM | BODY MASS INDEX: 24.33 KG/M2 | HEART RATE: 79 BPM | WEIGHT: 155 LBS

## 2022-08-23 DIAGNOSIS — Z20.822 SUSPECTED COVID-19 VIRUS INFECTION: Primary | ICD-10-CM

## 2022-08-23 DIAGNOSIS — J40 BRONCHITIS: ICD-10-CM

## 2022-08-23 LAB
EXPIRATION DATE: NORMAL
INTERNAL CONTROL: NORMAL
Lab: NORMAL
SARS-COV-2 AG UPPER RESP QL IA.RAPID: NOT DETECTED

## 2022-08-23 PROCEDURE — 99213 OFFICE O/P EST LOW 20 MIN: CPT | Performed by: NURSE PRACTITIONER

## 2022-08-23 PROCEDURE — 87426 SARSCOV CORONAVIRUS AG IA: CPT | Performed by: NURSE PRACTITIONER

## 2022-08-23 RX ORDER — METHYLPREDNISOLONE 4 MG/1
TABLET ORAL
Qty: 1 EACH | Refills: 0 | Status: SHIPPED | OUTPATIENT
Start: 2022-08-23 | End: 2022-10-11

## 2022-08-23 RX ORDER — AZITHROMYCIN 250 MG/1
TABLET, FILM COATED ORAL
Qty: 6 TABLET | Refills: 0 | Status: SHIPPED | OUTPATIENT
Start: 2022-08-23 | End: 2022-08-28

## 2022-08-23 NOTE — PROGRESS NOTES
Subjective   Chief Complaint:  Evaluation of Respiratory Symptoms    History of Present Illness:  This 32 y.o. male was seen in the office today in the drive-thru.  Reports has been experiencing cough, sore throat, chest congestion, runny nose with an acute onset starting yesterday.    Pertinent negatives include no report of fever, loss of taste or smell.    Patient reports recent exposure to active Covid-19 cases.  Reports exposure was approximately 4 days ago        Covid-19 Vaccination Status: Moderna x2-not boosted    Allergies   Allergen Reactions   • Sulfa Antibiotics Unknown - Low Severity      Current Outpatient Medications on File Prior to Visit   Medication Sig   • aspirin 325 MG tablet Take 325 mg by mouth Daily.   • bumetanide (BUMEX) 1 MG tablet    • cycloSPORINE modified (NEORAL) 100 MG capsule    • cycloSPORINE modified (NEORAL) 50 MG capsule    • losartan (COZAAR) 50 MG tablet    • [DISCONTINUED] predniSONE (DELTASONE) 20 MG tablet      No current facility-administered medications on file prior to visit.      Past Medical, Surgical, Social, and Family History:  Past Medical History:   Diagnosis Date   • Kidney stone      Past Surgical History:   Procedure Laterality Date   • TONSILLECTOMY       Social History     Socioeconomic History   • Marital status: Single   Tobacco Use   • Smoking status: Former Smoker     Types: Cigarettes   • Smokeless tobacco: Never Used   • Tobacco comment: stopped 3 weeks ago.    Substance and Sexual Activity   • Alcohol use: Not Currently     Comment: occasional   • Drug use: Yes     Types: Marijuana     Comment: last use last week     Family History   Problem Relation Age of Onset   • Diabetes Mother    • Heart disease Father    • Diabetes Paternal Uncle      Objective   Covid Precautions Maintained  Physical Exam  Constitutional:       General: He is not in acute distress.     Appearance: He is ill-appearing.   Pulmonary:      Effort: Pulmonary effort is normal. No  "respiratory distress.   Neurological:      Mental Status: He is alert.     Pulse 79   Temp 97.8 °F (36.6 °C)   Resp 14   Ht 170.2 cm (67\")   Wt 70.3 kg (155 lb)   SpO2 96%   BMI 24.28 kg/m²     Assessment & Plan   Diagnoses and all orders for this visit:    1. Suspected COVID-19 virus infection (Primary)  -     POCT VERITOR SARS-CoV-2 Antigen    2. Bronchitis    Other orders  -     azithromycin (Zithromax Z-Bertrand) 250 MG tablet; Take 2 tablets the first day, then 1 tablet daily for 4 days.  Dispense: 6 tablet; Refill: 0  -     methylPREDNISolone (MEDROL) 4 MG dose pack; Take as directed on package instructions.  Dispense: 1 each; Refill: 0    Discussion:  Advised and educated plan of care.  Advised can retest on day 5 of exposure to be absolutely thorough-can use home test.  Advised with exposure still a good idea to mask when out and about x10 days from exposure.    Verified COVID-negative via antigen test.    Follow-up:  Return if symptoms worsen or fail to improve.    Electronically signed by PEACE Cohn, 08/23/22, 2:09 PM CDT.  "

## 2022-10-11 ENCOUNTER — OFFICE VISIT (OUTPATIENT)
Dept: FAMILY MEDICINE CLINIC | Facility: CLINIC | Age: 32
End: 2022-10-11

## 2022-10-11 VITALS
WEIGHT: 173 LBS | HEART RATE: 93 BPM | OXYGEN SATURATION: 97 % | DIASTOLIC BLOOD PRESSURE: 74 MMHG | BODY MASS INDEX: 27.15 KG/M2 | HEIGHT: 67 IN | SYSTOLIC BLOOD PRESSURE: 126 MMHG

## 2022-10-11 DIAGNOSIS — N28.9 NEPHROPATHY: Primary | ICD-10-CM

## 2022-10-11 PROCEDURE — 99213 OFFICE O/P EST LOW 20 MIN: CPT | Performed by: NURSE PRACTITIONER

## 2022-10-11 RX ORDER — PREDNISONE 20 MG/1
TABLET ORAL
COMMUNITY
Start: 2022-09-23

## 2022-10-11 NOTE — PROGRESS NOTES
Subjective   Chief Complaint:  Increased swelling    History of Present Illness:  This 32 y.o. male was seen in the office today.  Lyses frustrations with increased swelling to lower extremities.  He does wear support hose.  Reports if he elevates his legs his pelvis and scrotum swells but the edema seems to move depending on position.  He reports his nephrology appointment is not until December.  He reports that he was told there are more medications for his underlying condition that can be given but are not covered by the hospitals in [place].    Allergies   Allergen Reactions   • Sulfa Antibiotics Unknown - Low Severity      Current Outpatient Medications on File Prior to Visit   Medication Sig   • bumetanide (BUMEX) 1 MG tablet    • losartan (COZAAR) 50 MG tablet    • predniSONE (DELTASONE) 20 MG tablet    • [DISCONTINUED] aspirin 325 MG tablet Take 325 mg by mouth Daily.   • [DISCONTINUED] cycloSPORINE modified (NEORAL) 100 MG capsule    • [DISCONTINUED] cycloSPORINE modified (NEORAL) 50 MG capsule    • [DISCONTINUED] methylPREDNISolone (MEDROL) 4 MG dose pack Take as directed on package instructions.     No current facility-administered medications on file prior to visit.      Past Medical, Surgical, Social, and Family History:  Past Medical History:   Diagnosis Date   • Kidney stone      Past Surgical History:   Procedure Laterality Date   • TONSILLECTOMY       Social History     Socioeconomic History   • Marital status: Single   Tobacco Use   • Smoking status: Former     Types: Cigarettes   • Smokeless tobacco: Never   • Tobacco comments:     stopped 3 weeks ago.    Substance and Sexual Activity   • Alcohol use: Not Currently     Comment: occasional   • Drug use: Yes     Types: Marijuana     Comment: last use last week     Family History   Problem Relation Age of Onset   • Diabetes Mother    • Heart disease Father    • Diabetes Paternal Uncle        Prior Visit Notes/Records, Lab, Imaging, and Diagnostic  "Results Reviewed:  A1C:No results for input(s): HGBA1C in the last 62168 hours.  GLUCOSE:  Lab Results - Last 18 Months   Lab Units 09/07/21  1508 08/11/21  1616   GLUCOSE mg/dL 134* 108     LIPID:No results for input(s): CHLPL, LDL, HDL, TRIG in the last 65323 hours.  PSA:No results for input(s): PSA in the last 55706 hours.  CBC:No results for input(s): WBC, HGB, HCT, PLT, IRONSERUM, IRON in the last 00412 hours.   BMP/CMP:  Lab Results - Last 18 Months   Lab Units 09/07/21  1508 08/11/21  1616   SODIUM mmol/L 140 133*   POTASSIUM mmol/L 4.1 3.1*   CHLORIDE mmol/L 96* 90*   TOTAL CO2 mmol/L 32* 31*   GLUCOSE mg/dL 134* 108   BUN mg/dL 25* 19   CREATININE mg/dL 0.9 0.9   EGFR IF NONAFRICN AM  >60 >60   EGFR IF AFRICN AM  >59 >59   CALCIUM mg/dL 10.0 8.4*     HEPATIC:  Lab Results - Last 18 Months   Lab Units 09/07/21  1508   ALT (SGPT) U/L 25   AST (SGOT) U/L 13   ALK PHOS U/L 63     Vit D:No results for input(s): TDFU92LA in the last 28241 hours.  THYROID:No results for input(s): TSH, FREET4, FTI in the last 80344 hours.    Invalid input(s): FREET3, T3, T4, TEUP,  TOTALT4    Objective   Physical Exam  Constitutional:       General: He is not in acute distress.  Cardiovascular:      Rate and Rhythm: Normal rate and regular rhythm.      Pulses: Normal pulses.      Heart sounds: No murmur heard.    No friction rub. No gallop.      Comments: Nonpitting lower extremity edema present-knee-high support hose present and contributing to the exam findings.  Pulmonary:      Effort: Pulmonary effort is normal. No respiratory distress.      Breath sounds: Normal breath sounds. No wheezing or rhonchi.   Neurological:      Mental Status: He is alert.     /74   Pulse 93   Ht 170.2 cm (67\")   Wt 78.5 kg (173 lb)   SpO2 97%   BMI 27.10 kg/m²     Assessment & Plan   Diagnoses and all orders for this visit:    1. Nephropathy (Primary)  -     Ambulatory Referral to Nephrology    Discussion:  Advised and educated plan of " care.  Advised continue Bumex and prednisone as ordered by nephrology-advised to call and see if he can get a sooner appointment otherwise, he is wanting a new referral in the meantime to try to get a second opinion and get one that is on the [place] side.    Follow-up:  Return if symptoms worsen or fail to improve.    Electronically signed by PEACE Cohn, 10/11/22, 11:52 AM CDT.

## 2022-10-20 ENCOUNTER — TELEPHONE (OUTPATIENT)
Dept: FAMILY MEDICINE CLINIC | Facility: CLINIC | Age: 32
End: 2022-10-20

## 2022-10-20 NOTE — TELEPHONE ENCOUNTER
Ok for referral to be sent to higher facility.    Electronically signed by PEACE Cohn, 10/20/22, 8:52 AM CDT.

## 2022-10-20 NOTE — TELEPHONE ENCOUNTER
[facility] kidney specialist called and said they think he needs to go to a bigger facility, is it okay if I send the referral to [facility] or [facility]?

## 2023-09-21 ENCOUNTER — OFFICE VISIT (OUTPATIENT)
Dept: FAMILY MEDICINE CLINIC | Facility: CLINIC | Age: 33
End: 2023-09-21

## 2023-09-21 VITALS
HEIGHT: 67 IN | TEMPERATURE: 97.3 F | HEART RATE: 78 BPM | OXYGEN SATURATION: 98 % | RESPIRATION RATE: 18 BRPM | SYSTOLIC BLOOD PRESSURE: 118 MMHG | BODY MASS INDEX: 29.35 KG/M2 | DIASTOLIC BLOOD PRESSURE: 78 MMHG | WEIGHT: 187 LBS

## 2023-09-21 DIAGNOSIS — J01.90 ACUTE NON-RECURRENT SINUSITIS, UNSPECIFIED LOCATION: Primary | ICD-10-CM

## 2023-09-21 PROCEDURE — 1160F RVW MEDS BY RX/DR IN RCRD: CPT | Performed by: NURSE PRACTITIONER

## 2023-09-21 PROCEDURE — 1159F MED LIST DOCD IN RCRD: CPT | Performed by: NURSE PRACTITIONER

## 2023-09-21 PROCEDURE — 99213 OFFICE O/P EST LOW 20 MIN: CPT | Performed by: NURSE PRACTITIONER

## 2023-09-21 RX ORDER — POTASSIUM CHLORIDE 20 MEQ/1
TABLET, EXTENDED RELEASE ORAL
COMMUNITY
Start: 2023-09-06

## 2023-09-21 RX ORDER — METHYLPREDNISOLONE 4 MG/1
TABLET ORAL
Qty: 1 EACH | Refills: 0 | Status: SHIPPED | OUTPATIENT
Start: 2023-09-21

## 2023-09-21 RX ORDER — AMOXICILLIN AND CLAVULANATE POTASSIUM 875; 125 MG/1; MG/1
1 TABLET, FILM COATED ORAL 2 TIMES DAILY
Qty: 20 TABLET | Refills: 0 | Status: SHIPPED | OUTPATIENT
Start: 2023-09-21 | End: 2023-10-01

## 2023-09-21 RX ORDER — ERGOCALCIFEROL 1.25 MG/1
CAPSULE ORAL
COMMUNITY
Start: 2023-09-05

## 2023-09-21 NOTE — PROGRESS NOTES
Subjective   Chief Complaint:  Ear fullness    History of Present Illness:  This 33 y.o. male was seen in the office today.      The patient presents today with ear fullness in the left ear with an acute onset 1 to 2 weeks. He reports no fevers. He reports pain has been so painful that he has required some warm packs. He reports no drainage from the ear. He reports an occasional slight cough.    Allergies   Allergen Reactions    Sulfa Antibiotics Unknown - Low Severity      Current Outpatient Medications on File Prior to Visit   Medication Sig    potassium chloride (K-DUR,KLOR-CON) 20 MEQ CR tablet     vitamin D (ERGOCALCIFEROL) 1.25 MG (79452 UT) capsule capsule     bumetanide (BUMEX) 1 MG tablet     losartan (COZAAR) 50 MG tablet     [DISCONTINUED] predniSONE (DELTASONE) 20 MG tablet      No current facility-administered medications on file prior to visit.      Past Medical, Surgical, Social, and Family History:  Past Medical History:   Diagnosis Date    Kidney stone      Past Surgical History:   Procedure Laterality Date    TONSILLECTOMY       Social History     Socioeconomic History    Marital status: Single   Tobacco Use    Smoking status: Former     Types: Cigarettes    Smokeless tobacco: Never    Tobacco comments:     stopped 3 weeks ago.    Substance and Sexual Activity    Alcohol use: Not Currently     Comment: occasional    Drug use: Yes     Types: Marijuana     Comment: last use last week     Family History   Problem Relation Age of Onset    Diabetes Mother     Heart disease Father     Diabetes Paternal Uncle        Prior Visit Notes/Records, Lab, Imaging, and Diagnostic Results Reviewed:  A1C:No results for input(s): HGBA1C in the last 88572 hours.  GLUCOSE:No results for input(s): GLUCOSE in the last 26099 hours.  LIPID:No results for input(s): CHLPL, LDL, HDL, TRIG in the last 57081 hours.  PSA:No results for input(s): PSA in the last 19020 hours.  CBC:No results for input(s): WBC, HGB, HCT, PLT,  "IRONSERUM, IRON in the last 23014 hours.   BMP/CMP:No results for input(s): NA, K, CL, CO2, GLUCOSE, BUN, CREATININE, EGFRIFNONA, EGFRIFAFRI, EGFRRESULT, CALCIUM in the last 77108 hours.  HEPATIC:No results for input(s): ALT, AST, ALKPHOS, TOTAL in the last 82916 hours.    Invalid input(s): HEPATITSC  Vit D:No results for input(s): CBZA98AA in the last 09114 hours.  THYROID:No results for input(s): TSH, FREET4, FTI in the last 12156 hours.    Invalid input(s): FREET3, T3, T4, TEUP,  TOTALT4  BMI Trend:  BMI Readings from Last 10 Encounters:   09/21/23 29.29 kg/m²   10/11/22 27.10 kg/m²   08/23/22 24.28 kg/m²   09/30/21 23.93 kg/m²   08/30/21 23.49 kg/m²   03/04/21 23.78 kg/m²   03/01/21 23.81 kg/m²   02/05/21 22.39 kg/m²   02/02/21 22.24 kg/m²   02/01/21 22.40 kg/m²     Objective   Vital Signs  /78 (BP Location: Right arm, Patient Position: Sitting, Cuff Size: Adult)   Pulse 78   Temp 97.3 °F (36.3 °C) (Infrared)   Resp 18   Ht 170.2 cm (67\")   Wt 84.8 kg (187 lb)   SpO2 98%   BMI 29.29 kg/m²   Physical Exam  Constitutional:       General: He is not in acute distress.  HENT:      Ears:      Comments: Bilateral ear congestion.       Mouth/Throat:      Comments: Thick postnasal drip present.  Cardiovascular:      Rate and Rhythm: Normal rate and regular rhythm.      Pulses: Normal pulses.      Heart sounds: No murmur heard.    No friction rub. No gallop.   Pulmonary:      Effort: Pulmonary effort is normal. No respiratory distress.      Breath sounds: Normal breath sounds. No wheezing or rhonchi.   Neurological:      Mental Status: He is alert.       Assessment & Plan   Diagnoses and all orders for this visit:    1. Acute non-recurrent sinusitis, unspecified location (Primary)    2. BMI 29.0-29.9,adult    Other orders  -     amoxicillin-clavulanate (AUGMENTIN) 875-125 MG per tablet; Take 1 tablet by mouth 2 (Two) Times a Day for 10 days.  Dispense: 20 tablet; Refill: 0  -     methylPREDNISolone (MEDROL) 4 " MG dose pack; Take as directed on package instructions.  Dispense: 1 each; Refill: 0    Discussion:  Advised and educated plan of care.    Advised patient the ears are congested, but they do not appear to be infected. It appears that these are more of an after effect from a sinus infection. He is agreeable to start antibiotics and steroids to follow.    Follow-up:  Return for follow-up as needed.    Transcribed from ambient dictation for PEACE Cohn by Ralph Montes.  09/21/23   17:12 CDT    I have personally performed the services described in this document as transcribed by the above individual, and it is both accurate and complete.     Electronically signed by Singh Solares, 09/21/23, 5:12 PM CDT.

## 2024-05-15 ENCOUNTER — OFFICE VISIT (OUTPATIENT)
Dept: FAMILY MEDICINE CLINIC | Facility: CLINIC | Age: 34
End: 2024-05-15
Payer: COMMERCIAL

## 2024-05-15 VITALS
TEMPERATURE: 98.3 F | SYSTOLIC BLOOD PRESSURE: 132 MMHG | HEIGHT: 67 IN | RESPIRATION RATE: 18 BRPM | HEART RATE: 139 BPM | DIASTOLIC BLOOD PRESSURE: 74 MMHG | OXYGEN SATURATION: 96 % | WEIGHT: 190 LBS | BODY MASS INDEX: 29.82 KG/M2

## 2024-05-15 DIAGNOSIS — N02.2 MEMBRANOUS NEPHROPATHY DETERMINED BY BIOPSY: Primary | ICD-10-CM

## 2024-05-15 PROCEDURE — 1159F MED LIST DOCD IN RCRD: CPT | Performed by: FAMILY MEDICINE

## 2024-05-15 PROCEDURE — 1160F RVW MEDS BY RX/DR IN RCRD: CPT | Performed by: FAMILY MEDICINE

## 2024-05-15 PROCEDURE — 1125F AMNT PAIN NOTED PAIN PRSNT: CPT | Performed by: FAMILY MEDICINE

## 2024-05-15 PROCEDURE — 99213 OFFICE O/P EST LOW 20 MIN: CPT | Performed by: FAMILY MEDICINE

## 2024-05-15 NOTE — PROGRESS NOTES
"Subjective   Faustino Almanzar is a 34 y.o. male.     Chief Complaint   Patient presents with    GI Problem        GI Problem         He had vomiting and diarrhea yesterday that resolved ---he cliff a disability form for me to fillout due to his membranous nephropathy      Current Outpatient Medications:     bumetanide (BUMEX) 1 MG tablet, , Disp: , Rfl:     losartan (COZAAR) 50 MG tablet, , Disp: , Rfl:     potassium chloride (K-DUR,KLOR-CON) 20 MEQ CR tablet, , Disp: , Rfl:     vitamin D (ERGOCALCIFEROL) 1.25 MG (62723 UT) capsule capsule, , Disp: , Rfl:   Allergies   Allergen Reactions    Sulfa Antibiotics Unknown - Low Severity              Facility age limit for growth %sujata is 20 years.    Past Medical History:   Diagnosis Date    Kidney stone      Past Surgical History:   Procedure Laterality Date    TONSILLECTOMY         Review of Systems   Constitutional: Negative.    HENT: Negative.     Eyes: Negative.    Respiratory: Negative.     Cardiovascular:  Positive for leg swelling.   Gastrointestinal: Negative.    Endocrine: Negative.    Genitourinary: Negative.    Musculoskeletal: Negative.    Skin: Negative.    Allergic/Immunologic: Negative.    Neurological: Negative.    Psychiatric/Behavioral: Negative.         Objective /74   Pulse (!) 139   Temp 98.3 °F (36.8 °C)   Resp 18   Ht 170.2 cm (67.01\")   Wt 86.2 kg (190 lb)   SpO2 96%   BMI 29.75 kg/m²    Physical Exam  Vitals and nursing note reviewed.   Constitutional:       Appearance: Normal appearance.   HENT:      Head: Normocephalic and atraumatic.      Nose: Nose normal.      Mouth/Throat:      Mouth: Mucous membranes are moist.   Eyes:      Pupils: Pupils are equal, round, and reactive to light.   Cardiovascular:      Rate and Rhythm: Normal rate and regular rhythm.      Pulses: Normal pulses.      Heart sounds: Normal heart sounds.   Pulmonary:      Effort: Pulmonary effort is normal.      Breath sounds: Normal breath sounds.   Abdominal: "      General: Abdomen is flat. Bowel sounds are normal.      Palpations: Abdomen is soft.   Musculoskeletal:         General: Swelling present.      Cervical back: Normal range of motion and neck supple.      Right lower leg: Edema present.      Left lower leg: Edema present.   Skin:     General: Skin is warm.      Capillary Refill: Capillary refill takes less than 2 seconds.   Neurological:      General: No focal deficit present.      Mental Status: He is alert.   Psychiatric:         Mood and Affect: Mood normal.         Assessment & Plan   Diagnoses and all orders for this visit:    1. Membranous nephropathy determined by biopsy (Primary)      He will follow up with dr abdul           No orders of the defined types were placed in this encounter.      Follow up: 8 month(s)

## 2025-03-20 ENCOUNTER — HOSPITAL ENCOUNTER (INPATIENT)
Age: 35
LOS: 5 days | Discharge: HOME | DRG: 674 | End: 2025-03-25
Attending: EMERGENCY MEDICINE | Admitting: INTERNAL MEDICINE
Payer: COMMERCIAL

## 2025-03-20 ENCOUNTER — APPOINTMENT (OUTPATIENT)
Dept: CT IMAGING | Age: 35
DRG: 674 | End: 2025-03-20
Payer: COMMERCIAL

## 2025-03-20 ENCOUNTER — APPOINTMENT (OUTPATIENT)
Dept: GENERAL RADIOLOGY | Age: 35
DRG: 674 | End: 2025-03-20
Payer: COMMERCIAL

## 2025-03-20 DIAGNOSIS — E83.42 HYPOMAGNESEMIA: ICD-10-CM

## 2025-03-20 DIAGNOSIS — E87.6 HYPOKALEMIA: ICD-10-CM

## 2025-03-20 DIAGNOSIS — N18.9 ACUTE KIDNEY INJURY SUPERIMPOSED ON CKD: Primary | ICD-10-CM

## 2025-03-20 DIAGNOSIS — N17.9 ACUTE KIDNEY INJURY SUPERIMPOSED ON CKD: Primary | ICD-10-CM

## 2025-03-20 LAB
25(OH)D3 SERPL-MCNC: 6.5 NG/ML
ACANTHOCYTES BLD QL SMEAR: ABNORMAL
ALBUMIN SERPL-MCNC: 2.6 G/DL (ref 3.5–5.2)
ALP SERPL-CCNC: 80 U/L (ref 40–129)
ALT SERPL-CCNC: 13 U/L (ref 10–50)
ANION GAP SERPL CALCULATED.3IONS-SCNC: 13 MMOL/L (ref 8–16)
AST SERPL-CCNC: 12 U/L (ref 10–50)
BASO STIPL BLD QL SMEAR: ABNORMAL
BASOPHILS # BLD: 0 K/UL (ref 0–0.2)
BASOPHILS NFR BLD: 0 % (ref 0–1)
BILIRUB SERPL-MCNC: <0.2 MG/DL (ref 0.2–1.2)
BUN SERPL-MCNC: 36 MG/DL (ref 6–20)
CALCIUM SERPL-MCNC: 7.7 MG/DL (ref 8.6–10)
CHLORIDE SERPL-SCNC: 111 MMOL/L (ref 98–107)
CO2 SERPL-SCNC: 20 MMOL/L (ref 22–29)
CREAT SERPL-MCNC: 5.2 MG/DL (ref 0.7–1.2)
EOSINOPHIL # BLD: 0.35 K/UL (ref 0–0.6)
EOSINOPHIL NFR BLD: 1 % (ref 0–5)
ERYTHROCYTE [DISTWIDTH] IN BLOOD BY AUTOMATED COUNT: 14 % (ref 11.5–14.5)
FERRITIN SERPL-MCNC: 401 NG/ML (ref 30–400)
FOLATE SERPL-MCNC: 3.5 NG/ML (ref 4.5–32.2)
GLUCOSE SERPL-MCNC: 127 MG/DL (ref 70–99)
HCT VFR BLD AUTO: 28.8 % (ref 42–52)
HGB BLD-MCNC: 9.3 G/DL (ref 14–18)
HYPOCHROMIA BLD QL SMEAR: ABNORMAL
IMM GRANULOCYTES # BLD: 0.9 K/UL
IRON SATN MFR SERPL: 25 % (ref 20–50)
IRON SERPL-MCNC: 50 UG/DL (ref 59–158)
LACTATE BLDV-SCNC: 1.1 MG/DL (ref 0.5–1.9)
LACTATE BLDV-SCNC: 1.4 MG/DL (ref 0.5–1.9)
LIPASE SERPL-CCNC: 26 U/L (ref 13–60)
LYMPHOCYTES # BLD: 4.2 K/UL (ref 1.1–4.5)
LYMPHOCYTES NFR BLD: 12 % (ref 20–40)
MAGNESIUM SERPL-MCNC: 1.4 MG/DL (ref 1.6–2.6)
MCH RBC QN AUTO: 30 PG (ref 27–31)
MCHC RBC AUTO-ENTMCNC: 32.3 G/DL (ref 33–37)
MCV RBC AUTO: 92.9 FL (ref 80–94)
MONOCYTES # BLD: 0.7 K/UL (ref 0–0.9)
MONOCYTES NFR BLD: 2 % (ref 0–10)
NEUTROPHILS # BLD: 30 K/UL (ref 1.5–7.5)
NEUTS BAND NFR BLD MANUAL: 1 % (ref 0–5)
NEUTS SEG NFR BLD: 84 % (ref 50–65)
OVALOCYTES BLD QL SMEAR: ABNORMAL
PHOSPHATE SERPL-MCNC: 3.2 MG/DL (ref 2.5–4.5)
PLATELET # BLD AUTO: 583 K/UL (ref 130–400)
PLATELET SLIDE REVIEW: ABNORMAL
PMV BLD AUTO: 10.3 FL (ref 9.4–12.4)
POLYCHROMASIA BLD QL SMEAR: ABNORMAL
POTASSIUM SERPL-SCNC: 2.2 MMOL/L (ref 3.5–5.1)
PROT SERPL-MCNC: 5.3 G/DL (ref 6.4–8.3)
PTH-INTACT SERPL-MCNC: 47.9 PG/ML (ref 15–65)
RBC # BLD AUTO: 3.1 M/UL (ref 4.7–6.1)
SODIUM SERPL-SCNC: 144 MMOL/L (ref 136–145)
SPHEROCYTES BLD QL SMEAR: ABNORMAL
TIBC SERPL-MCNC: 197 UG/DL (ref 250–400)
URATE SERPL-MCNC: 4 MG/DL (ref 3.4–7)
VIT B12 SERPL-MCNC: 474 PG/ML (ref 232–1245)
WBC # BLD AUTO: 35.3 K/UL (ref 4.8–10.8)

## 2025-03-20 PROCEDURE — 83970 ASSAY OF PARATHORMONE: CPT

## 2025-03-20 PROCEDURE — 83540 ASSAY OF IRON: CPT

## 2025-03-20 PROCEDURE — 71045 X-RAY EXAM CHEST 1 VIEW: CPT

## 2025-03-20 PROCEDURE — 82607 VITAMIN B-12: CPT

## 2025-03-20 PROCEDURE — 83550 IRON BINDING TEST: CPT

## 2025-03-20 PROCEDURE — 6360000002 HC RX W HCPCS: Performed by: EMERGENCY MEDICINE

## 2025-03-20 PROCEDURE — 1200000000 HC SEMI PRIVATE

## 2025-03-20 PROCEDURE — 99285 EMERGENCY DEPT VISIT HI MDM: CPT

## 2025-03-20 PROCEDURE — 84100 ASSAY OF PHOSPHORUS: CPT

## 2025-03-20 PROCEDURE — 83690 ASSAY OF LIPASE: CPT

## 2025-03-20 PROCEDURE — 83735 ASSAY OF MAGNESIUM: CPT

## 2025-03-20 PROCEDURE — 85025 COMPLETE CBC W/AUTO DIFF WBC: CPT

## 2025-03-20 PROCEDURE — 6370000000 HC RX 637 (ALT 250 FOR IP)

## 2025-03-20 PROCEDURE — 87040 BLOOD CULTURE FOR BACTERIA: CPT

## 2025-03-20 PROCEDURE — 83605 ASSAY OF LACTIC ACID: CPT

## 2025-03-20 PROCEDURE — 82306 VITAMIN D 25 HYDROXY: CPT

## 2025-03-20 PROCEDURE — 96374 THER/PROPH/DIAG INJ IV PUSH: CPT

## 2025-03-20 PROCEDURE — 82746 ASSAY OF FOLIC ACID SERUM: CPT

## 2025-03-20 PROCEDURE — 2580000003 HC RX 258: Performed by: EMERGENCY MEDICINE

## 2025-03-20 PROCEDURE — 74150 CT ABDOMEN W/O CONTRAST: CPT

## 2025-03-20 PROCEDURE — 82728 ASSAY OF FERRITIN: CPT

## 2025-03-20 PROCEDURE — 84550 ASSAY OF BLOOD/URIC ACID: CPT

## 2025-03-20 PROCEDURE — 93005 ELECTROCARDIOGRAM TRACING: CPT | Performed by: EMERGENCY MEDICINE

## 2025-03-20 PROCEDURE — 36415 COLL VENOUS BLD VENIPUNCTURE: CPT

## 2025-03-20 PROCEDURE — 94760 N-INVAS EAR/PLS OXIMETRY 1: CPT

## 2025-03-20 PROCEDURE — 80053 COMPREHEN METABOLIC PANEL: CPT

## 2025-03-20 RX ORDER — TACROLIMUS 1 MG/1
1 CAPSULE ORAL 2 TIMES DAILY
COMMUNITY

## 2025-03-20 RX ORDER — SPIRONOLACTONE 50 MG/1
50 TABLET, FILM COATED ORAL DAILY
COMMUNITY

## 2025-03-20 RX ORDER — ENOXAPARIN SODIUM 100 MG/ML
30 INJECTION SUBCUTANEOUS DAILY
Status: DISCONTINUED | OUTPATIENT
Start: 2025-03-21 | End: 2025-03-22

## 2025-03-20 RX ORDER — REPOSITORY CORTICOTROPIN 80 [USP'U]/ML
INJECTION INTRAMUSCULAR; SUBCUTANEOUS
COMMUNITY

## 2025-03-20 RX ORDER — MAGNESIUM SULFATE IN WATER 40 MG/ML
2000 INJECTION, SOLUTION INTRAVENOUS ONCE
Status: COMPLETED | OUTPATIENT
Start: 2025-03-20 | End: 2025-03-20

## 2025-03-20 RX ORDER — ONDANSETRON 2 MG/ML
4 INJECTION INTRAMUSCULAR; INTRAVENOUS EVERY 6 HOURS PRN
Status: DISCONTINUED | OUTPATIENT
Start: 2025-03-20 | End: 2025-03-25 | Stop reason: HOSPADM

## 2025-03-20 RX ORDER — SODIUM CHLORIDE 0.9 % (FLUSH) 0.9 %
10 SYRINGE (ML) INJECTION PRN
Status: DISCONTINUED | OUTPATIENT
Start: 2025-03-20 | End: 2025-03-25 | Stop reason: HOSPADM

## 2025-03-20 RX ORDER — SODIUM CHLORIDE, SODIUM LACTATE, POTASSIUM CHLORIDE, AND CALCIUM CHLORIDE .6; .31; .03; .02 G/100ML; G/100ML; G/100ML; G/100ML
1000 INJECTION, SOLUTION INTRAVENOUS ONCE
Status: COMPLETED | OUTPATIENT
Start: 2025-03-20 | End: 2025-03-20

## 2025-03-20 RX ORDER — LOSARTAN POTASSIUM 50 MG/1
50 TABLET ORAL DAILY
COMMUNITY

## 2025-03-20 RX ORDER — SODIUM CHLORIDE 0.9 % (FLUSH) 0.9 %
10 SYRINGE (ML) INJECTION EVERY 12 HOURS SCHEDULED
Status: DISCONTINUED | OUTPATIENT
Start: 2025-03-20 | End: 2025-03-25 | Stop reason: HOSPADM

## 2025-03-20 RX ORDER — POTASSIUM CHLORIDE 7.45 MG/ML
10 INJECTION INTRAVENOUS ONCE
Status: COMPLETED | OUTPATIENT
Start: 2025-03-20 | End: 2025-03-20

## 2025-03-20 RX ORDER — BUMETANIDE 1 MG/1
1 TABLET ORAL DAILY
COMMUNITY

## 2025-03-20 RX ORDER — SODIUM CHLORIDE 9 MG/ML
INJECTION, SOLUTION INTRAVENOUS PRN
Status: DISCONTINUED | OUTPATIENT
Start: 2025-03-20 | End: 2025-03-25 | Stop reason: HOSPADM

## 2025-03-20 RX ORDER — POTASSIUM CHLORIDE 1500 MG/1
40 TABLET, EXTENDED RELEASE ORAL ONCE
Status: COMPLETED | OUTPATIENT
Start: 2025-03-20 | End: 2025-03-20

## 2025-03-20 RX ORDER — ONDANSETRON 4 MG/1
4 TABLET, ORALLY DISINTEGRATING ORAL EVERY 8 HOURS PRN
Status: DISCONTINUED | OUTPATIENT
Start: 2025-03-20 | End: 2025-03-25 | Stop reason: HOSPADM

## 2025-03-20 RX ORDER — ACETAMINOPHEN 650 MG/1
650 SUPPOSITORY RECTAL EVERY 6 HOURS PRN
Status: DISCONTINUED | OUTPATIENT
Start: 2025-03-20 | End: 2025-03-25 | Stop reason: HOSPADM

## 2025-03-20 RX ORDER — ACETAMINOPHEN 325 MG/1
650 TABLET ORAL EVERY 6 HOURS PRN
Status: DISCONTINUED | OUTPATIENT
Start: 2025-03-20 | End: 2025-03-25 | Stop reason: HOSPADM

## 2025-03-20 RX ORDER — HYDRALAZINE HYDROCHLORIDE 20 MG/ML
10 INJECTION INTRAMUSCULAR; INTRAVENOUS ONCE
Status: COMPLETED | OUTPATIENT
Start: 2025-03-20 | End: 2025-03-20

## 2025-03-20 RX ADMIN — POTASSIUM CHLORIDE 40 MEQ: 1500 TABLET, EXTENDED RELEASE ORAL at 18:05

## 2025-03-20 RX ADMIN — SODIUM CHLORIDE, SODIUM LACTATE, POTASSIUM CHLORIDE, AND CALCIUM CHLORIDE 1000 ML: .6; .31; .03; .02 INJECTION, SOLUTION INTRAVENOUS at 18:02

## 2025-03-20 RX ADMIN — HYDRALAZINE HYDROCHLORIDE 10 MG: 20 INJECTION INTRAMUSCULAR; INTRAVENOUS at 22:04

## 2025-03-20 RX ADMIN — MAGNESIUM SULFATE HEPTAHYDRATE 2000 MG: 40 INJECTION, SOLUTION INTRAVENOUS at 19:09

## 2025-03-20 RX ADMIN — POTASSIUM CHLORIDE 10 MEQ: 7.46 INJECTION, SOLUTION INTRAVENOUS at 18:04

## 2025-03-20 NOTE — H&P
[facility] Medicine H&P    Patient:  Khalif Trotter  MRN: [id number]    Consulting Physician: Nick Maxwell DO  Reason for Consult: Medical Management  Primacy Care Physician: Sevreo Hayward, APRN - CNP    HISTORY OF PRESENT ILLNESS:   The patient is a 35 y.o. male was sent into the ER for abnormal labs.  Mr. Trotter is a pleasant 35 year old gentleman who was diagnosed with membranous nephropathy a little over 4 years ago.  He is closely followed by nephrology.  He has been progressing from the standpoint of his CKD.  His last followup, he was stage IIIa.  He had routine labs today and his creatinine was up over 5 mg%.  He was sent to the ER.  He denies any significant vomiting or diarrhea.  No recent OTC meds.  He has had some unintentional weight gain.  He will be admitted for further monitoring and nephrology evaluation.    Past Medical History:        Diagnosis Date    Chronic kidney disease     Hypertension        Past Surgical History:        Procedure Laterality Date    TONSILLECTOMY         Medications: Scheduled Meds:   lactated ringers  1,000 mL IntraVENous Once     Continuous Infusions:  PRN Meds:.    Allergies:  Sulfa antibiotics    Social History:   TOBACCO:   reports that he has quit smoking. His smoking use included cigarettes. He has never used smokeless tobacco.  ETOH:   reports that he does not currently use alcohol.    Family History:   History reviewed. No pertinent family history.    ROS:  Review of Systems   Constitutional:  Positive for activity change, fatigue and unexpected weight change. Negative for fever.   HENT:  Negative for congestion and voice change.    Eyes:  Negative for visual disturbance.   Respiratory:  Negative for shortness of breath.    Cardiovascular:  Positive for leg swelling. Negative for chest pain.   Gastrointestinal:  Negative for constipation, diarrhea, nausea and vomiting.   Endocrine: Negative for polyuria.   Genitourinary:  Negative for difficulty urinating and

## 2025-03-20 NOTE — ED PROVIDER NOTES
[facility] EMERGENCY DEPARTMENT  eMERGENCY dEPARTMENT eNCOUnter      Pt Name: Khalif Trotter  MRN: [id number]  Birthdate 1990  Date of evaluation: 3/20/2025  Provider: JOURDAN ROY MD    CHIEF COMPLAINT       Chief Complaint   Patient presents with    Abnormal Lab     Abnormal kidney function today, hx of CKD         HISTORY OF PRESENT ILLNESS   (Location/Symptom, Timing/Onset,Context/Setting, Quality, Duration, Modifying Factors, Severity)  Note limiting factors.   Khalif Trotter is a 35 y.o. male who presents to the emergency department for abnormal labs.  Patient was diagnosed with numerous glomerular nephropathy approximately 4 years ago and is followed by Dr. Arambula.  He has known CKD and he had routine blood work drawn today at [facility] and was notified later today he had had significant lab abnormalities and to come to the emergency department.  He does admit to decreased urine output and some weight gain.  Admits to ongoing generalized weakness fatigue occasional vomiting denies any flank pain or UTI symptoms.  No fevers or chills.    HPI    NursingNotes were reviewed.    REVIEW OF SYSTEMS    (2-9 systems for level 4, 10 or more for level 5)     Review of Systems   Constitutional:  Positive for fatigue and unexpected weight change. Negative for chills and fever.   HENT:  Negative for rhinorrhea and sore throat.    Respiratory:  Negative for cough and shortness of breath.    Cardiovascular:  Positive for leg swelling. Negative for chest pain.   Gastrointestinal:  Positive for nausea and vomiting. Negative for abdominal pain and diarrhea.   Genitourinary:  Positive for decreased urine volume. Negative for dysuria and frequency.   Neurological:  Negative for dizziness and headaches.            PAST MEDICALHISTORY     Past Medical History:   Diagnosis Date    Chronic kidney disease     Hypertension          SURGICAL HISTORY       Past Surgical History:   Procedure Laterality Date    TONSILLECTOMY

## 2025-03-21 ENCOUNTER — APPOINTMENT (OUTPATIENT)
Dept: ULTRASOUND IMAGING | Age: 35
DRG: 674 | End: 2025-03-21
Payer: COMMERCIAL

## 2025-03-21 ENCOUNTER — APPOINTMENT (OUTPATIENT)
Dept: INTERVENTIONAL RADIOLOGY/VASCULAR | Age: 35
DRG: 674 | End: 2025-03-21
Payer: COMMERCIAL

## 2025-03-21 ENCOUNTER — ANESTHESIA (OUTPATIENT)
Dept: OPERATING ROOM | Age: 35
End: 2025-03-21
Payer: COMMERCIAL

## 2025-03-21 ENCOUNTER — ANESTHESIA EVENT (OUTPATIENT)
Dept: OPERATING ROOM | Age: 35
End: 2025-03-21
Payer: COMMERCIAL

## 2025-03-21 LAB
25(OH)D3 SERPL-MCNC: <6 NG/ML
ALBUMIN SERPL-MCNC: 2.3 G/DL (ref 3.5–5.2)
ALP SERPL-CCNC: 103 U/L (ref 40–129)
ALT SERPL-CCNC: 11 U/L (ref 10–50)
ANION GAP SERPL CALCULATED.3IONS-SCNC: 13 MMOL/L (ref 8–16)
AST SERPL-CCNC: 11 U/L (ref 10–50)
BACTERIA URNS QL MICRO: NEGATIVE /HPF
BASOPHILS # BLD: 0.1 K/UL (ref 0–0.2)
BASOPHILS NFR BLD: 0.3 % (ref 0–1)
BILIRUB SERPL-MCNC: <0.2 MG/DL (ref 0.2–1.2)
BILIRUB UR QL STRIP: NEGATIVE
BUN SERPL-MCNC: 36 MG/DL (ref 6–20)
CALCIUM SERPL-MCNC: 7.7 MG/DL (ref 8.6–10)
CHLORIDE SERPL-SCNC: 114 MMOL/L (ref 98–107)
CLARITY UR: CLEAR
CO2 SERPL-SCNC: 19 MMOL/L (ref 22–29)
COLOR UR: YELLOW
CREAT SERPL-MCNC: 5.1 MG/DL (ref 0.7–1.2)
CREAT UR-MCNC: 56.5 MG/DL (ref 39–259)
CREAT UR-MCNC: 63.7 MG/DL (ref 39–259)
CRYSTALS URNS MICRO: ABNORMAL /HPF
EOSINOPHIL # BLD: 0.2 K/UL (ref 0–0.6)
EOSINOPHIL NFR BLD: 0.6 % (ref 0–5)
EPI CELLS #/AREA URNS AUTO: 1 /HPF (ref 0–5)
ERYTHROCYTE [DISTWIDTH] IN BLOOD BY AUTOMATED COUNT: 14.2 % (ref 11.5–14.5)
FERRITIN SERPL-MCNC: 385 NG/ML (ref 30–400)
GLUCOSE SERPL-MCNC: 116 MG/DL (ref 70–99)
GLUCOSE UR STRIP.AUTO-MCNC: 100 MG/DL
HAV IGM SERPL QL IA: NONREACTIVE
HBV CORE IGM SERPL QL IA: NONREACTIVE
HBV SURFACE AB SERPL IA-ACNC: <3.5 M[IU]/ML
HBV SURFACE AG SERPL QL IA: NORMAL
HCT VFR BLD AUTO: 28 % (ref 42–52)
HCV AB SERPL QL IA: NORMAL
HGB BLD-MCNC: 8.9 G/DL (ref 14–18)
HGB UR STRIP.AUTO-MCNC: ABNORMAL MG/L
HYALINE CASTS #/AREA URNS AUTO: 11 /HPF (ref 0–8)
IMM GRANULOCYTES # BLD: 0.8 K/UL
KETONES UR STRIP.AUTO-MCNC: NEGATIVE MG/DL
LEUKOCYTE ESTERASE UR QL STRIP.AUTO: ABNORMAL
LYMPHOCYTES # BLD: 4.1 K/UL (ref 1.1–4.5)
LYMPHOCYTES NFR BLD: 13.8 % (ref 20–40)
MAGNESIUM SERPL-MCNC: 1.9 MG/DL (ref 1.6–2.6)
MCH RBC QN AUTO: 30.2 PG (ref 27–31)
MCHC RBC AUTO-ENTMCNC: 31.8 G/DL (ref 33–37)
MCV RBC AUTO: 94.9 FL (ref 80–94)
MICROALBUMIN UR-MCNC: >440 MG/DL (ref 0–1.99)
MICROALBUMIN/CREAT UR-RTO: ABNORMAL MG/G (ref 0–29)
MONOCYTES # BLD: 1.9 K/UL (ref 0–0.9)
MONOCYTES NFR BLD: 6.2 % (ref 0–10)
NEUTROPHILS # BLD: 23 K/UL (ref 1.5–7.5)
NEUTS SEG NFR BLD: 76.6 % (ref 50–65)
NITRITE UR QL STRIP.AUTO: NEGATIVE
PH UR STRIP.AUTO: 7 [PH] (ref 5–8)
PHOSPHATE SERPL-MCNC: 4.3 MG/DL (ref 2.5–4.5)
PLATELET # BLD AUTO: 546 K/UL (ref 130–400)
PMV BLD AUTO: 10.1 FL (ref 9.4–12.4)
POTASSIUM SERPL-SCNC: 2.2 MMOL/L (ref 3.5–5.1)
POTASSIUM SERPL-SCNC: 3 MMOL/L (ref 3.5–5.1)
PROT SERPL-MCNC: 4.8 G/DL (ref 6.4–8.3)
PROT UR STRIP.AUTO-MCNC: =>1000 MG/DL
PROT UR-MCNC: >600 MG/DL (ref 0–12)
PTH-INTACT SERPL-MCNC: 57.9 PG/ML (ref 15–65)
RBC # BLD AUTO: 2.95 M/UL (ref 4.7–6.1)
RBC #/AREA URNS AUTO: 4 /HPF (ref 0–4)
SODIUM SERPL-SCNC: 146 MMOL/L (ref 136–145)
SODIUM UR-SCNC: 34 MMOL/L
SP GR UR STRIP.AUTO: 1.02 (ref 1–1.03)
UROBILINOGEN UR STRIP.AUTO-MCNC: 0.2 E.U./DL
WBC # BLD AUTO: 30 K/UL (ref 4.8–10.8)
WBC #/AREA URNS AUTO: 4 /HPF (ref 0–5)

## 2025-03-21 PROCEDURE — 76937 US GUIDE VASCULAR ACCESS: CPT

## 2025-03-21 PROCEDURE — 6360000002 HC RX W HCPCS: Performed by: SURGERY

## 2025-03-21 PROCEDURE — 80074 ACUTE HEPATITIS PANEL: CPT

## 2025-03-21 PROCEDURE — 6360000002 HC RX W HCPCS: Performed by: INTERNAL MEDICINE

## 2025-03-21 PROCEDURE — 0JH63XZ INSERTION OF TUNNELED VASCULAR ACCESS DEVICE INTO CHEST SUBCUTANEOUS TISSUE AND FASCIA, PERCUTANEOUS APPROACH: ICD-10-PCS | Performed by: SURGERY

## 2025-03-21 PROCEDURE — 1200000000 HC SEMI PRIVATE

## 2025-03-21 PROCEDURE — 82043 UR ALBUMIN QUANTITATIVE: CPT

## 2025-03-21 PROCEDURE — 85025 COMPLETE CBC W/AUTO DIFF WBC: CPT

## 2025-03-21 PROCEDURE — 84156 ASSAY OF PROTEIN URINE: CPT

## 2025-03-21 PROCEDURE — 6360000002 HC RX W HCPCS: Performed by: HOSPITALIST

## 2025-03-21 PROCEDURE — 6370000000 HC RX 637 (ALT 250 FOR IP): Performed by: SURGERY

## 2025-03-21 PROCEDURE — 2580000003 HC RX 258: Performed by: NURSE ANESTHETIST, CERTIFIED REGISTERED

## 2025-03-21 PROCEDURE — 76770 US EXAM ABDO BACK WALL COMP: CPT

## 2025-03-21 PROCEDURE — 84100 ASSAY OF PHOSPHORUS: CPT

## 2025-03-21 PROCEDURE — 86706 HEP B SURFACE ANTIBODY: CPT

## 2025-03-21 PROCEDURE — C1750 CATH, HEMODIALYSIS,LONG-TERM: HCPCS | Performed by: SURGERY

## 2025-03-21 PROCEDURE — 5A1D70Z PERFORMANCE OF URINARY FILTRATION, INTERMITTENT, LESS THAN 6 HOURS PER DAY: ICD-10-PCS | Performed by: SURGERY

## 2025-03-21 PROCEDURE — 82728 ASSAY OF FERRITIN: CPT

## 2025-03-21 PROCEDURE — 83970 ASSAY OF PARATHORMONE: CPT

## 2025-03-21 PROCEDURE — 3600000013 HC SURGERY LEVEL 3 ADDTL 15MIN: Performed by: SURGERY

## 2025-03-21 PROCEDURE — 2580000003 HC RX 258: Performed by: INTERNAL MEDICINE

## 2025-03-21 PROCEDURE — C1769 GUIDE WIRE: HCPCS | Performed by: SURGERY

## 2025-03-21 PROCEDURE — 8010000000 HC HEMODIALYSIS ACUTE INPT

## 2025-03-21 PROCEDURE — 81001 URINALYSIS AUTO W/SCOPE: CPT

## 2025-03-21 PROCEDURE — 6370000000 HC RX 637 (ALT 250 FOR IP): Performed by: HOSPITALIST

## 2025-03-21 PROCEDURE — 2709999900 HC NON-CHARGEABLE SUPPLY: Performed by: SURGERY

## 2025-03-21 PROCEDURE — 84132 ASSAY OF SERUM POTASSIUM: CPT

## 2025-03-21 PROCEDURE — 84300 ASSAY OF URINE SODIUM: CPT

## 2025-03-21 PROCEDURE — 36415 COLL VENOUS BLD VENIPUNCTURE: CPT

## 2025-03-21 PROCEDURE — 83735 ASSAY OF MAGNESIUM: CPT

## 2025-03-21 PROCEDURE — 7100000000 HC PACU RECOVERY - FIRST 15 MIN: Performed by: SURGERY

## 2025-03-21 PROCEDURE — 6360000002 HC RX W HCPCS: Performed by: NURSE ANESTHETIST, CERTIFIED REGISTERED

## 2025-03-21 PROCEDURE — 3600000003 HC SURGERY LEVEL 3 BASE: Performed by: SURGERY

## 2025-03-21 PROCEDURE — 3700000000 HC ANESTHESIA ATTENDED CARE: Performed by: SURGERY

## 2025-03-21 PROCEDURE — 77001 FLUOROGUIDE FOR VEIN DEVICE: CPT

## 2025-03-21 PROCEDURE — 2500000003 HC RX 250 WO HCPCS: Performed by: SURGERY

## 2025-03-21 PROCEDURE — 7100000001 HC PACU RECOVERY - ADDTL 15 MIN: Performed by: SURGERY

## 2025-03-21 PROCEDURE — 2500000003 HC RX 250 WO HCPCS: Performed by: INTERNAL MEDICINE

## 2025-03-21 PROCEDURE — 82570 ASSAY OF URINE CREATININE: CPT

## 2025-03-21 PROCEDURE — 94760 N-INVAS EAR/PLS OXIMETRY 1: CPT

## 2025-03-21 PROCEDURE — 6370000000 HC RX 637 (ALT 250 FOR IP): Performed by: EMERGENCY MEDICINE

## 2025-03-21 PROCEDURE — 02HV33Z INSERTION OF INFUSION DEVICE INTO SUPERIOR VENA CAVA, PERCUTANEOUS APPROACH: ICD-10-PCS | Performed by: SURGERY

## 2025-03-21 PROCEDURE — 80053 COMPREHEN METABOLIC PANEL: CPT

## 2025-03-21 PROCEDURE — 3700000001 HC ADD 15 MINUTES (ANESTHESIA): Performed by: SURGERY

## 2025-03-21 PROCEDURE — 82306 VITAMIN D 25 HYDROXY: CPT

## 2025-03-21 PROCEDURE — 6370000000 HC RX 637 (ALT 250 FOR IP): Performed by: INTERNAL MEDICINE

## 2025-03-21 RX ORDER — SODIUM CHLORIDE 9 MG/ML
INJECTION, SOLUTION INTRAVENOUS PRN
Status: CANCELLED | OUTPATIENT
Start: 2025-03-21

## 2025-03-21 RX ORDER — LIDOCAINE HYDROCHLORIDE 10 MG/ML
INJECTION, SOLUTION INFILTRATION; PERINEURAL
Status: DISCONTINUED | OUTPATIENT
Start: 2025-03-21 | End: 2025-03-21 | Stop reason: SDUPTHER

## 2025-03-21 RX ORDER — POTASSIUM CHLORIDE 1500 MG/1
40 TABLET, EXTENDED RELEASE ORAL 3 TIMES DAILY
Status: DISCONTINUED | OUTPATIENT
Start: 2025-03-21 | End: 2025-03-23

## 2025-03-21 RX ORDER — CLONIDINE HYDROCHLORIDE 0.1 MG/1
0.2 TABLET ORAL 2 TIMES DAILY
Status: DISCONTINUED | OUTPATIENT
Start: 2025-03-21 | End: 2025-03-23

## 2025-03-21 RX ORDER — HYDROCODONE BITARTRATE AND ACETAMINOPHEN 5; 325 MG/1; MG/1
2 TABLET ORAL EVERY 4 HOURS PRN
Status: DISCONTINUED | OUTPATIENT
Start: 2025-03-21 | End: 2025-03-25 | Stop reason: HOSPADM

## 2025-03-21 RX ORDER — SPIRONOLACTONE 25 MG/1
50 TABLET ORAL DAILY
Status: DISCONTINUED | OUTPATIENT
Start: 2025-03-21 | End: 2025-03-22

## 2025-03-21 RX ORDER — DIPHENHYDRAMINE HYDROCHLORIDE 50 MG/ML
12.5 INJECTION, SOLUTION INTRAMUSCULAR; INTRAVENOUS
Status: CANCELLED | OUTPATIENT
Start: 2025-03-21

## 2025-03-21 RX ORDER — NALOXONE HYDROCHLORIDE 0.4 MG/ML
INJECTION, SOLUTION INTRAMUSCULAR; INTRAVENOUS; SUBCUTANEOUS PRN
Status: CANCELLED | OUTPATIENT
Start: 2025-03-21

## 2025-03-21 RX ORDER — HYDROCODONE BITARTRATE AND ACETAMINOPHEN 5; 325 MG/1; MG/1
1 TABLET ORAL EVERY 4 HOURS PRN
Status: DISCONTINUED | OUTPATIENT
Start: 2025-03-21 | End: 2025-03-25 | Stop reason: HOSPADM

## 2025-03-21 RX ORDER — HYDRALAZINE HYDROCHLORIDE 50 MG/1
50 TABLET, FILM COATED ORAL EVERY 8 HOURS SCHEDULED
Status: DISCONTINUED | OUTPATIENT
Start: 2025-03-21 | End: 2025-03-23

## 2025-03-21 RX ORDER — HYDROMORPHONE HYDROCHLORIDE 1 MG/ML
0.5 INJECTION, SOLUTION INTRAMUSCULAR; INTRAVENOUS; SUBCUTANEOUS EVERY 5 MIN PRN
Status: DISCONTINUED | OUTPATIENT
Start: 2025-03-21 | End: 2025-03-21 | Stop reason: HOSPADM

## 2025-03-21 RX ORDER — PROPOFOL 10 MG/ML
INJECTION, EMULSION INTRAVENOUS
Status: DISCONTINUED | OUTPATIENT
Start: 2025-03-21 | End: 2025-03-21 | Stop reason: SDUPTHER

## 2025-03-21 RX ORDER — SODIUM CHLORIDE 0.9 % (FLUSH) 0.9 %
5-40 SYRINGE (ML) INJECTION EVERY 12 HOURS SCHEDULED
Status: CANCELLED | OUTPATIENT
Start: 2025-03-21

## 2025-03-21 RX ORDER — SODIUM CHLORIDE 450 MG/100ML
INJECTION, SOLUTION INTRAVENOUS
Status: DISCONTINUED | OUTPATIENT
Start: 2025-03-21 | End: 2025-03-21 | Stop reason: SDUPTHER

## 2025-03-21 RX ORDER — POTASSIUM CHLORIDE 1500 MG/1
40 TABLET, EXTENDED RELEASE ORAL ONCE
Status: COMPLETED | OUTPATIENT
Start: 2025-03-21 | End: 2025-03-21

## 2025-03-21 RX ORDER — POTASSIUM CHLORIDE 7.45 MG/ML
10 INJECTION INTRAVENOUS
Status: DISCONTINUED | OUTPATIENT
Start: 2025-03-21 | End: 2025-03-21

## 2025-03-21 RX ORDER — ONDANSETRON 2 MG/ML
INJECTION INTRAMUSCULAR; INTRAVENOUS
Status: DISCONTINUED | OUTPATIENT
Start: 2025-03-21 | End: 2025-03-21 | Stop reason: SDUPTHER

## 2025-03-21 RX ORDER — CEFAZOLIN SODIUM 1 G/3ML
INJECTION, POWDER, FOR SOLUTION INTRAMUSCULAR; INTRAVENOUS
Status: DISCONTINUED | OUTPATIENT
Start: 2025-03-21 | End: 2025-03-21 | Stop reason: SDUPTHER

## 2025-03-21 RX ORDER — POTASSIUM CHLORIDE 7.45 MG/ML
10 INJECTION INTRAVENOUS PRN
Status: DISCONTINUED | OUTPATIENT
Start: 2025-03-21 | End: 2025-03-25 | Stop reason: HOSPADM

## 2025-03-21 RX ORDER — NIFEDIPINE 30 MG/1
30 TABLET, EXTENDED RELEASE ORAL DAILY
Status: DISCONTINUED | OUTPATIENT
Start: 2025-03-21 | End: 2025-03-21

## 2025-03-21 RX ORDER — METOCLOPRAMIDE HYDROCHLORIDE 5 MG/ML
10 INJECTION INTRAMUSCULAR; INTRAVENOUS
Status: DISCONTINUED | OUTPATIENT
Start: 2025-03-21 | End: 2025-03-21 | Stop reason: HOSPADM

## 2025-03-21 RX ORDER — SODIUM CHLORIDE 0.9 % (FLUSH) 0.9 %
5-40 SYRINGE (ML) INJECTION PRN
Status: CANCELLED | OUTPATIENT
Start: 2025-03-21

## 2025-03-21 RX ORDER — FENTANYL CITRATE 50 UG/ML
INJECTION, SOLUTION INTRAMUSCULAR; INTRAVENOUS
Status: DISCONTINUED | OUTPATIENT
Start: 2025-03-21 | End: 2025-03-21 | Stop reason: SDUPTHER

## 2025-03-21 RX ORDER — NIFEDIPINE 30 MG/1
60 TABLET, EXTENDED RELEASE ORAL DAILY
Status: DISCONTINUED | OUTPATIENT
Start: 2025-03-22 | End: 2025-03-24

## 2025-03-21 RX ORDER — MEPERIDINE HYDROCHLORIDE 25 MG/ML
12.5 INJECTION INTRAMUSCULAR; INTRAVENOUS; SUBCUTANEOUS EVERY 5 MIN PRN
Status: DISCONTINUED | OUTPATIENT
Start: 2025-03-21 | End: 2025-03-21 | Stop reason: HOSPADM

## 2025-03-21 RX ORDER — MIDAZOLAM HYDROCHLORIDE 1 MG/ML
INJECTION, SOLUTION INTRAMUSCULAR; INTRAVENOUS
Status: DISCONTINUED | OUTPATIENT
Start: 2025-03-21 | End: 2025-03-21 | Stop reason: SDUPTHER

## 2025-03-21 RX ORDER — HYDROMORPHONE HYDROCHLORIDE 1 MG/ML
0.25 INJECTION, SOLUTION INTRAMUSCULAR; INTRAVENOUS; SUBCUTANEOUS EVERY 5 MIN PRN
Status: DISCONTINUED | OUTPATIENT
Start: 2025-03-21 | End: 2025-03-21 | Stop reason: HOSPADM

## 2025-03-21 RX ORDER — POTASSIUM CHLORIDE 1500 MG/1
40 TABLET, EXTENDED RELEASE ORAL PRN
Status: DISCONTINUED | OUTPATIENT
Start: 2025-03-21 | End: 2025-03-25 | Stop reason: HOSPADM

## 2025-03-21 RX ORDER — LIDOCAINE HYDROCHLORIDE 20 MG/ML
INJECTION, SOLUTION EPIDURAL; INFILTRATION; INTRACAUDAL; PERINEURAL PRN
Status: DISCONTINUED | OUTPATIENT
Start: 2025-03-21 | End: 2025-03-21 | Stop reason: ALTCHOICE

## 2025-03-21 RX ORDER — MAGNESIUM SULFATE IN WATER 40 MG/ML
2000 INJECTION, SOLUTION INTRAVENOUS PRN
Status: DISCONTINUED | OUTPATIENT
Start: 2025-03-21 | End: 2025-03-25 | Stop reason: HOSPADM

## 2025-03-21 RX ADMIN — LIDOCAINE HYDROCHLORIDE 50 MG: 10 INJECTION, SOLUTION INFILTRATION; PERINEURAL at 15:48

## 2025-03-21 RX ADMIN — SODIUM CHLORIDE, PRESERVATIVE FREE 10 ML: 5 INJECTION INTRAVENOUS at 23:26

## 2025-03-21 RX ADMIN — POTASSIUM CHLORIDE 10 MEQ: 7.46 INJECTION, SOLUTION INTRAVENOUS at 10:08

## 2025-03-21 RX ADMIN — ENOXAPARIN SODIUM 30 MG: 100 INJECTION SUBCUTANEOUS at 09:58

## 2025-03-21 RX ADMIN — HEPARIN SODIUM 3200 UNITS: 1000 INJECTION INTRAVENOUS; SUBCUTANEOUS at 22:39

## 2025-03-21 RX ADMIN — POTASSIUM CHLORIDE 40 MEQ: 1500 TABLET, EXTENDED RELEASE ORAL at 23:25

## 2025-03-21 RX ADMIN — MIDAZOLAM 2 MG: 1 INJECTION INTRAMUSCULAR; INTRAVENOUS at 15:42

## 2025-03-21 RX ADMIN — ONDANSETRON 4 MG: 2 INJECTION, SOLUTION INTRAMUSCULAR; INTRAVENOUS at 09:57

## 2025-03-21 RX ADMIN — FENTANYL CITRATE 50 MCG: 0.05 INJECTION, SOLUTION INTRAMUSCULAR; INTRAVENOUS at 16:00

## 2025-03-21 RX ADMIN — SODIUM CHLORIDE: 4.5 INJECTION, SOLUTION INTRAVENOUS at 15:42

## 2025-03-21 RX ADMIN — PROPOFOL 120 MCG/KG/MIN: 10 INJECTION, EMULSION INTRAVENOUS at 15:50

## 2025-03-21 RX ADMIN — POTASSIUM CHLORIDE: 2 INJECTION, SOLUTION, CONCENTRATE INTRAVENOUS at 11:45

## 2025-03-21 RX ADMIN — CEFAZOLIN 2 G: 1 INJECTION, POWDER, FOR SOLUTION INTRAMUSCULAR; INTRAVENOUS at 15:53

## 2025-03-21 RX ADMIN — POTASSIUM CHLORIDE 40 MEQ: 1500 TABLET, EXTENDED RELEASE ORAL at 09:56

## 2025-03-21 RX ADMIN — PROPOFOL 30 MG: 10 INJECTION, EMULSION INTRAVENOUS at 15:53

## 2025-03-21 RX ADMIN — FENTANYL CITRATE 50 MCG: 0.05 INJECTION, SOLUTION INTRAMUSCULAR; INTRAVENOUS at 15:53

## 2025-03-21 RX ADMIN — PROPOFOL 50 MG: 10 INJECTION, EMULSION INTRAVENOUS at 15:49

## 2025-03-21 RX ADMIN — SODIUM CHLORIDE, PRESERVATIVE FREE 10 ML: 5 INJECTION INTRAVENOUS at 09:58

## 2025-03-21 RX ADMIN — NIFEDIPINE 30 MG: 30 TABLET, EXTENDED RELEASE ORAL at 09:57

## 2025-03-21 RX ADMIN — ONDANSETRON 4 MG: 2 INJECTION INTRAMUSCULAR; INTRAVENOUS at 15:42

## 2025-03-21 RX ADMIN — SODIUM CHLORIDE: 0.9 INJECTION, SOLUTION INTRAVENOUS at 10:06

## 2025-03-21 RX ADMIN — IRON SUCROSE 200 MG: 20 INJECTION, SOLUTION INTRAVENOUS at 11:15

## 2025-03-21 RX ADMIN — CLONIDINE HYDROCHLORIDE 0.2 MG: 0.1 TABLET ORAL at 11:41

## 2025-03-21 RX ADMIN — POTASSIUM CHLORIDE 40 MEQ: 1500 TABLET, EXTENDED RELEASE ORAL at 04:34

## 2025-03-21 SDOH — ECONOMIC STABILITY: INCOME INSECURITY: IN THE PAST 12 MONTHS, HAS THE ELECTRIC, GAS, OIL, OR WATER COMPANY THREATENED TO SHUT OFF SERVICE IN YOUR HOME?: NO

## 2025-03-21 SDOH — ECONOMIC STABILITY: FOOD INSECURITY: WITHIN THE PAST 12 MONTHS, YOU WORRIED THAT YOUR FOOD WOULD RUN OUT BEFORE YOU GOT MONEY TO BUY MORE.: NEVER TRUE

## 2025-03-21 SDOH — ECONOMIC STABILITY: INCOME INSECURITY: HOW HARD IS IT FOR YOU TO PAY FOR THE VERY BASICS LIKE FOOD, HOUSING, MEDICAL CARE, AND HEATING?: NOT VERY HARD

## 2025-03-21 ASSESSMENT — LIFESTYLE VARIABLES: SMOKING_STATUS: 0

## 2025-03-21 ASSESSMENT — PAIN SCALES - GENERAL
PAINLEVEL_OUTOF10: 0

## 2025-03-21 ASSESSMENT — ENCOUNTER SYMPTOMS: SHORTNESS OF BREATH: 1

## 2025-03-21 ASSESSMENT — PAIN - FUNCTIONAL ASSESSMENT
PAIN_FUNCTIONAL_ASSESSMENT: NONE - DENIES PAIN
PAIN_FUNCTIONAL_ASSESSMENT: FACE, LEGS, ACTIVITY, CRY, AND CONSOLABILITY (FLACC)

## 2025-03-21 ASSESSMENT — PATIENT HEALTH QUESTIONNAIRE - PHQ9
SUM OF ALL RESPONSES TO PHQ QUESTIONS 1-9: 0
SUM OF ALL RESPONSES TO PHQ QUESTIONS 1-9: 0
1. LITTLE INTEREST OR PLEASURE IN DOING THINGS: NOT AT ALL
SUM OF ALL RESPONSES TO PHQ QUESTIONS 1-9: 0
2. FEELING DOWN, DEPRESSED OR HOPELESS: NOT AT ALL
SUM OF ALL RESPONSES TO PHQ QUESTIONS 1-9: 0

## 2025-03-21 NOTE — ED NOTES
ED TO INPATIENT SBAR HANDOFF    Patient Name: Khalif Trotter   : 1990  35 y.o.   Family/Caregiver Present: No  Code Status Order: No Order    C-SSRS: Risk of Suicide: No Risk  Sitter No  Restraints:         Situation  Chief Complaint:   Chief Complaint   Patient presents with    Abnormal Lab     Abnormal kidney function today, hx of CKD     Patient Diagnosis: KEN (acute kidney injury) [N17.9]     Brief Description of Patient's Condition: Khalif Trotter is a 35 y.o. male who presents to the emergency department for abnormal labs.  Patient was diagnosed with numerous glomerular nephropathy approximately 4 years ago and is followed by Dr. Arambula.  He has known CKD and he had routine blood work drawn today at [facility] and was notified later today he had had significant lab abnormalities and to come to the emergency department.  He does admit to decreased urine output and some weight gain.  Admits to ongoing generalized weakness fatigue occasional vomiting denies any flank pain or UTI symptoms.  No fevers or chills.   Mental Status: oriented, alert, coherent, logical, thought processes intact, and able to concentrate and follow conversation  Arrived from: home    Imaging:   CT KIDNEY WO CONTRAST   Final Result   1.  Moderate right pleural effusion with adjacent atelectatic lung.   2.  No urinary tract calculus or hydronephrosis.   3.  Atherosclerosis.   4.  Otherwise unremarkable noncontrast CT scan of the abdomen and pelvis.        All CT scans are performed using dose optimization techniques as appropriate to the performed exam and include    at least one of the following: Automated exposure control, adjustment of the mA and/or kV according to size, and the use of iterative reconstruction technique.        ______________________________________    Electronically signed by: KAYLI OLIVA M.D.   Date:     2025   Time:    18:30       XR CHEST PORTABLE   Final Result   Impression:       Smoothly marginated

## 2025-03-21 NOTE — CONSULTS
Nephrology ([facility]) Consult Note      Patient:  Khalif Trotter  YOB: 1990  Date of Service: 3/21/2025  MRN: [id number]   Acct: [id number]   Primary Care Physician: Severo Hayward APRN - CNP  Advance Directive: Full Code  Admit Date: 3/20/2025       Hospital Day: 1  Referring Provider: Ana Morrell MD    Patient independently seen and examined, Chart, Consults, Notes, Operative notes, Labs, Cardiology, and Radiology studies reviewed as available.    Chief complaint: Abnormal labs.    Subjective:  Khalif Trotter is a 35 y.o. male for whom we were consulted for evaluation and treatment of acute kidney injury/chronic kidney disease.  Patient was diagnosed with idiopathic membranous glomerulonephritis, status post renal biopsy.  Due to lack of coverage, he was unable to get Rituxan or Cytoxan.  He was treated with combination of tacrolimus and Acthar gel.  He had episode of remission followed by recent relapse with no improvement in his proteinuria.  Due to increasing edema he required high dose of loop diuretics as well as Aldactone.  Patient also experienced persistent hypokalemia despite replacement of potassium.  He was directed to go to the emergency room his creatinine now is more than 5 mg and potassium is only 2.2 minimal.  Patient denies any nausea or vomiting.  He also denies any use of nonsteroidal agents.  He is reporting profound fatigue, lack of energy    Allergies:  Sulfa antibiotics    Medicines:  Current Facility-Administered Medications   Medication Dose Route Frequency Provider Last Rate Last Admin    potassium chloride 20 mEq, sodium bicarbonate 100 mEq in dextrose 5 % and 0.45 % NaCl 1,000 mL infusion   IntraVENous Continuous Ana Morrell MD        potassium chloride 10 mEq/100 mL IVPB (Peripheral Line)  10 mEq IntraVENous Q1H Ana Morrell  mL/hr at 03/21/25 1008 10 mEq at 03/21/25 1008    hydrALAZINE (APRESOLINE) tablet 50 mg  50 mg Oral 3 
tablet 40 mEq  40 mEq Oral TID Jared Arambula MD        [Transfer Hold] spironolactone (ALDACTONE) tablet 50 mg  50 mg Oral Daily Jared Arambula MD        [Transfer Hold] potassium chloride 30 mEq in sodium chloride 0.9 % 1,000 mL infusion   IntraVENous Continuous Jared Arambula  mL/hr at 03/21/25 1145 New Bag at 03/21/25 1145    [Transfer Hold] iron sucrose (VENOFER) injection 200 mg  200 mg IntraVENous Q24H Jared Arambula MD   200 mg at 03/21/25 1115    [Transfer Hold] cloNIDine (CATAPRES) tablet 0.2 mg  0.2 mg Oral BID Jared Arambula MD   0.2 mg at 03/21/25 1141    ceFAZolin (ANCEF) 2,000 mg in sterile water 20 mL IV syringe  2,000 mg IntraVENous Once Thad Lowe MD        [Transfer Hold] sodium chloride flush 0.9 % injection 10 mL  10 mL IntraVENous 2 times per day Nick Maxwell DO   10 mL at 03/21/25 0958    [Transfer Hold] sodium chloride flush 0.9 % injection 10 mL  10 mL IntraVENous PRN Nick Maxwell DO        [Transfer Hold] 0.9 % sodium chloride infusion   IntraVENous PRN Nick Maxwell DO   Stopped at 03/21/25 1114    [Transfer Hold] enoxaparin Sodium (LOVENOX) injection 30 mg  30 mg SubCUTAneous Daily Nick Maxwell DO   30 mg at 03/21/25 0958    [Transfer Hold] ondansetron (ZOFRAN-ODT) disintegrating tablet 4 mg  4 mg Oral Q8H PRN Nick Maxwell DO        Or    [Transfer Hold] ondansetron (ZOFRAN) injection 4 mg  4 mg IntraVENous Q6H PRN Nick Maxwell DO   4 mg at 03/21/25 0957    [Transfer Hold] magnesium hydroxide (MILK OF MAGNESIA) 400 MG/5ML suspension 30 mL  30 mL Oral Daily PRN Nick Maxwell DO        [Transfer Hold] acetaminophen (TYLENOL) tablet 650 mg  650 mg Oral Q6H PRN Nick Maxwell DO        Or    [Transfer Hold] acetaminophen (TYLENOL) suppository 650 mg  650 mg Rectal Q6H PRN Nick Maxwell, DO         Allergies: Sulfa antibiotics  Past Medical History:   Diagnosis Date    Chronic kidney disease     Hypertension

## 2025-03-21 NOTE — DISCHARGE INSTRUCTIONS
[facility]  Housing Resources*  (Call 211/[facility] for additional resources)    Emergency Shelter Assistance    [facility] “[facility] Start [facility]” - for women and children.  [address], [place], [place] [address]  Website: https://[facility].org/emergency-shelter/  233.304.3889    [facility]- for single men or  couples and children.   [address] [place], [place] [address]  Website: https://[facility].org/  [phone]    [facility]- for women and children.  [place], [place]   Website: http://www.[employer]/  008.730.2107    [facility] [place]- for [facility] residents with custody of children  [address] [place], [place] [address]  [phone]    [facility]- for single men  Website: https://[facility].org/  [address] [address]  [phone]    [facility]- for women and children fleeing domestic violence  Website: https://mer[employer]Cast.org  [place], [place]   24-hour crisis line- 8.953.531.6681 -or- [phone]    [place] [employer] [facility]- contacts familiar with local resources   Resource Guide: https://[facility].org/Programs/Documents/Community%20Resource%20Guide.pdf  [address] [place], [place] [address]  [phone] ext [phone] -or- [phone] ext [phone]    [place] [facility] [employer] [facility] ([facility])- You may call [place] APS to ask for help in finding shelter when you do not have other options, such as family or friends, or is the local shelters are full.   Local Phone [phone] (Open Monday through Friday 8:00am to 4:00pm)  Website: https://www.[facility].ky.gov/agencies/dcbs/dpp/apb  24 Hour HOTLINE: 0.430.718.7176    [facility]  Website: https://www.[facility]/[place]/homeless/shelters#all   [address] [place] [address]  [phone]  [phone]    [facility]- Emergency Shelter

## 2025-03-21 NOTE — ANESTHESIA POSTPROCEDURE EVALUATION
Department of Anesthesiology  Postprocedure Note    Patient: Khalif Trotter  MRN: [id number]  YOB: 1990  Date of evaluation: 3/21/2025    Procedure Summary       Date: 03/21/25 Room / Location: [address]    Anesthesia Start: 1542 Anesthesia Stop: 1636    Procedure: PERMCATH INSERTION (Chest) Diagnosis:       Acute kidney injury      (Acute kidney injury [N17.9])    Surgeons: Thad Lowe MD Responsible Provider: Hansel Cordova APRN - CRNA    Anesthesia Type: TIVA, MAC ASA Status: 4            Anesthesia Type: No value filed.    Edward Phase I: Edward Score: 4    Edward Phase II:      Anesthesia Post Evaluation    Patient location during evaluation: PACU  Patient participation: complete - patient participated  Level of consciousness: obtunded/minimal responses  Pain score: 0  Airway patency: patent  Nausea & Vomiting: no nausea and no vomiting  Cardiovascular status: hemodynamically stable  Respiratory status: acceptable, spontaneous ventilation, nonlabored ventilation and face mask  Hydration status: stable  Comments: BP (!) 89/57   Pulse 71   Temp 97 °F (36.1 °C) (Skin)   Resp 14   Ht 1.702 m (5' 7\")   Wt 81.6 kg (180 lb)   SpO2 94%   BMI 28.19 kg/m²     Pain management: adequate    No notable events documented.

## 2025-03-21 NOTE — ANESTHESIA PRE PROCEDURE
AM       POC Tests: No results for input(s): \"POCGLU\", \"POCNA\", \"POCK\", \"POCCL\", \"POCBUN\", \"POCHEMO\", \"POCHCT\" in the last 72 hours.    Coags: No results found for: \"PROTIME\", \"INR\", \"APTT\"    HCG (If Applicable): No results found for: \"PREGTESTUR\", \"PREGSERUM\", \"HCG\", \"HCGQUANT\"     ABGs: No results found for: \"PHART\", \"PO2ART\", \"WUY9PXR\", \"FEQ8TGG\", \"BEART\", \"M0UXVWDO\"     Type & Screen (If Applicable):  No results found for: \"ABORH\", \"LABANTI\"    Drug/Infectious Status (If Applicable):  No results found for: \"HIV\", \"HEPCAB\"    COVID-19 Screening (If Applicable): No results found for: \"COVID19\"        Anesthesia Evaluation  Patient summary reviewed and Nursing notes reviewed  Airway: Mallampati: III  TM distance: >3 FB   Neck ROM: full  Mouth opening: < 3 FB   Dental: normal exam         Pulmonary: breath sounds clear to auscultation  (+)   shortness of breath:                                    Cardiovascular:  Exercise tolerance: poor (<4 METS)  (+) hypertension:, CAVAZOS:        Rhythm: regular  Rate: normal           Beta Blocker:  Not on Beta Blocker         Neuro/Psych:   Negative Neuro/Psych ROS              GI/Hepatic/Renal:   (+) GERD: well controlled, renal disease: CRI and dialysis          Endo/Other:    (+) blood dyscrasia: anemia:., electrolyte abnormalities.                 Abdominal:   (+) obese          Vascular: negative vascular ROS.         Other Findings:       Anesthesia Plan      TIVA and MAC     ASA 4       Induction: intravenous.    MIPS: Postoperative opioids intended and Prophylactic antiemetics administered.  Anesthetic plan and risks discussed with patient.    Use of blood products discussed with patient whom.                  NOLVIA Bailey - CRNA   3/21/2025            
                  Lucy Carlos MD   3/21/2025

## 2025-03-22 LAB
ALBUMIN SERPL-MCNC: 2.1 G/DL (ref 3.5–5.2)
ALP SERPL-CCNC: 49 U/L (ref 40–129)
ALT SERPL-CCNC: 9 U/L (ref 10–50)
ANION GAP SERPL CALCULATED.3IONS-SCNC: 8 MMOL/L (ref 8–16)
AST SERPL-CCNC: 13 U/L (ref 10–50)
BILIRUB SERPL-MCNC: <0.2 MG/DL (ref 0.2–1.2)
BUN SERPL-MCNC: 15 MG/DL (ref 6–20)
CALCIUM SERPL-MCNC: 7 MG/DL (ref 8.6–10)
CHLORIDE SERPL-SCNC: 109 MMOL/L (ref 98–107)
CO2 SERPL-SCNC: 27 MMOL/L (ref 22–29)
CREAT SERPL-MCNC: 2.8 MG/DL (ref 0.7–1.2)
ERYTHROCYTE [DISTWIDTH] IN BLOOD BY AUTOMATED COUNT: 14.6 % (ref 11.5–14.5)
GLUCOSE SERPL-MCNC: 95 MG/DL (ref 70–99)
HCT VFR BLD AUTO: 27.1 % (ref 42–52)
HGB BLD-MCNC: 8.5 G/DL (ref 14–18)
MCH RBC QN AUTO: 30.4 PG (ref 27–31)
MCHC RBC AUTO-ENTMCNC: 31.4 G/DL (ref 33–37)
MCV RBC AUTO: 96.8 FL (ref 80–94)
PLATELET # BLD AUTO: 495 K/UL (ref 130–400)
PMV BLD AUTO: 10.6 FL (ref 9.4–12.4)
POTASSIUM SERPL-SCNC: 3 MMOL/L (ref 3.5–5.1)
PROT SERPL-MCNC: 4.4 G/DL (ref 6.4–8.3)
RBC # BLD AUTO: 2.8 M/UL (ref 4.7–6.1)
SODIUM SERPL-SCNC: 144 MMOL/L (ref 136–145)
WBC # BLD AUTO: 21 K/UL (ref 4.8–10.8)

## 2025-03-22 PROCEDURE — 6360000002 HC RX W HCPCS: Performed by: SURGERY

## 2025-03-22 PROCEDURE — 80053 COMPREHEN METABOLIC PANEL: CPT

## 2025-03-22 PROCEDURE — 8010000000 HC HEMODIALYSIS ACUTE INPT

## 2025-03-22 PROCEDURE — 2580000003 HC RX 258: Performed by: SURGERY

## 2025-03-22 PROCEDURE — 6370000000 HC RX 637 (ALT 250 FOR IP): Performed by: SURGERY

## 2025-03-22 PROCEDURE — 2500000003 HC RX 250 WO HCPCS: Performed by: SURGERY

## 2025-03-22 PROCEDURE — 6360000002 HC RX W HCPCS: Performed by: INTERNAL MEDICINE

## 2025-03-22 PROCEDURE — 94760 N-INVAS EAR/PLS OXIMETRY 1: CPT

## 2025-03-22 PROCEDURE — 36415 COLL VENOUS BLD VENIPUNCTURE: CPT

## 2025-03-22 PROCEDURE — 6360000002 HC RX W HCPCS: Performed by: HOSPITALIST

## 2025-03-22 PROCEDURE — 1200000000 HC SEMI PRIVATE

## 2025-03-22 PROCEDURE — 85027 COMPLETE CBC AUTOMATED: CPT

## 2025-03-22 RX ORDER — SPIRONOLACTONE 25 MG/1
25 TABLET ORAL DAILY
Status: DISCONTINUED | OUTPATIENT
Start: 2025-03-23 | End: 2025-03-23

## 2025-03-22 RX ORDER — TACROLIMUS 1 MG/1
1 CAPSULE ORAL 2 TIMES DAILY
Status: DISCONTINUED | OUTPATIENT
Start: 2025-03-22 | End: 2025-03-25 | Stop reason: HOSPADM

## 2025-03-22 RX ORDER — ENOXAPARIN SODIUM 100 MG/ML
40 INJECTION SUBCUTANEOUS DAILY
Status: DISCONTINUED | OUTPATIENT
Start: 2025-03-22 | End: 2025-03-23 | Stop reason: ALTCHOICE

## 2025-03-22 RX ADMIN — POTASSIUM CHLORIDE 40 MEQ: 1500 TABLET, EXTENDED RELEASE ORAL at 14:19

## 2025-03-22 RX ADMIN — POTASSIUM CHLORIDE: 2 INJECTION, SOLUTION, CONCENTRATE INTRAVENOUS at 00:03

## 2025-03-22 RX ADMIN — POTASSIUM CHLORIDE 40 MEQ: 1500 TABLET, EXTENDED RELEASE ORAL at 06:05

## 2025-03-22 RX ADMIN — POTASSIUM CHLORIDE 40 MEQ: 1500 TABLET, EXTENDED RELEASE ORAL at 11:26

## 2025-03-22 RX ADMIN — SODIUM CHLORIDE, PRESERVATIVE FREE 10 ML: 5 INJECTION INTRAVENOUS at 19:43

## 2025-03-22 RX ADMIN — HEPARIN SODIUM 3200 UNITS: 1000 INJECTION INTRAVENOUS; SUBCUTANEOUS at 10:57

## 2025-03-22 RX ADMIN — NIFEDIPINE 60 MG: 30 TABLET, EXTENDED RELEASE ORAL at 11:26

## 2025-03-22 RX ADMIN — ENOXAPARIN SODIUM 40 MG: 100 INJECTION SUBCUTANEOUS at 11:25

## 2025-03-22 RX ADMIN — POTASSIUM CHLORIDE 40 MEQ: 1500 TABLET, EXTENDED RELEASE ORAL at 19:43

## 2025-03-22 RX ADMIN — CLONIDINE HYDROCHLORIDE 0.2 MG: 0.1 TABLET ORAL at 19:43

## 2025-03-22 RX ADMIN — IRON SUCROSE 200 MG: 20 INJECTION, SOLUTION INTRAVENOUS at 11:26

## 2025-03-22 RX ADMIN — HYDRALAZINE HYDROCHLORIDE 50 MG: 50 TABLET ORAL at 22:35

## 2025-03-22 RX ADMIN — SODIUM CHLORIDE, PRESERVATIVE FREE 10 ML: 5 INJECTION INTRAVENOUS at 11:32

## 2025-03-22 RX ADMIN — TACROLIMUS 1 MG: 1 CAPSULE ORAL at 19:43

## 2025-03-22 NOTE — FLOWSHEET NOTE
[facility]  DIALYSIS TREATMENT SUMMARY      Note: Consult with the attending physician for patient treatment orders, this document is not a physician order.      Patient Information   Patient ROBERT SALCIDO   Date of Birth January 14, 1990   Chart Number [id number]   [facility] MRN [id number]   Gender Male   SSN (last 4) [id number]     Treatment Information   Treatment Type Hemodialysis   Treatment Id [id number]   Start Time March 21, 2025 19:45   End Time March 21, 2025 22:36   Acutal Duration 02:51     Treatment Balances   Total Saline Administered 500   -Other 400   Net Fluid Balance 1500    Hemodialysis Orders   Therapy Standard   Orders Verified Time 03/21/2025 19:10    Date Verified 03/21/2025   Duration 2:45   Isolated UF/Bypass No   BFR (mL) 300   DFR X1.5 BFR   DFR (mL) 600   Dialyzer Type OPTIFLUX 160NR   UF Order UF Range   UF Range (mL) 0 - 1500   With BP Parameters Yes   As Tolerated Yes   Crit-Line used No   Heparin Initial Units Bolus No   Heparin IV Maintenance Bolus No   Heparin IV Infusion No   Potassium (mEq/L) 4   Calcium (mEq/L) 2.25   Bicarb (mg/dL) 40   Sodium (mEq/L) 137   Additional Orders KEEP SYSTOLIC BLOOD PRESSURE GREATER THAN 90   Clinician Mk Staley, ELLI    Dialysis Access   Access Type Central Venous Catheter   Central Venous Catheter   Access Type Catheter - Tunneled   Date Central Venous Catheter Placed 03/21/2025   Access Location Chest Wall - R   Surgeon DR. DAVIS   1st Use Catheter Verified by Fluoroscopy   Catheter Care Completed per Policy Yes   Dressing Dry and Intact on Arrival Yes   Dressing Changed No   Type of Dressing Film Biopatch/CHG   Last Date Changed 03/21/2025   If No select Reason Fresh post-op dressing   Type of HD Caps Curos   HD Caps Changed Yes   CVC Line Education Provided Yes - Dressing Change Frequency   Comments/Notes RIGHT IJ PERMCATH, PATENT X 2 PORTS, SITE CLEAR      Vitals   Pre-Treatment Vitals

## 2025-03-23 LAB
ALBUMIN SERPL-MCNC: 2.4 G/DL (ref 3.5–5.2)
ALP SERPL-CCNC: 54 U/L (ref 40–129)
ALT SERPL-CCNC: 7 U/L (ref 10–50)
ANION GAP SERPL CALCULATED.3IONS-SCNC: 8 MMOL/L (ref 8–16)
AST SERPL-CCNC: 13 U/L (ref 10–50)
BILIRUB SERPL-MCNC: <0.2 MG/DL (ref 0.2–1.2)
BUN SERPL-MCNC: 15 MG/DL (ref 6–20)
CALCIUM SERPL-MCNC: 7.3 MG/DL (ref 8.6–10)
CHLORIDE SERPL-SCNC: 110 MMOL/L (ref 98–107)
CO2 SERPL-SCNC: 22 MMOL/L (ref 22–29)
CREAT SERPL-MCNC: 3.2 MG/DL (ref 0.7–1.2)
EKG P AXIS: 33 DEGREES
EKG P-R INTERVAL: 142 MS
EKG Q-T INTERVAL: 326 MS
EKG QRS DURATION: 90 MS
EKG QTC CALCULATION (BAZETT): 415 MS
EKG T AXIS: 111 DEGREES
ERYTHROCYTE [DISTWIDTH] IN BLOOD BY AUTOMATED COUNT: 14.4 % (ref 11.5–14.5)
GLUCOSE SERPL-MCNC: 124 MG/DL (ref 70–99)
HCT VFR BLD AUTO: 29.7 % (ref 42–52)
HGB BLD-MCNC: 9 G/DL (ref 14–18)
MCH RBC QN AUTO: 30 PG (ref 27–31)
MCHC RBC AUTO-ENTMCNC: 30.3 G/DL (ref 33–37)
MCV RBC AUTO: 99 FL (ref 80–94)
PLATELET # BLD AUTO: 476 K/UL (ref 130–400)
PMV BLD AUTO: 10.6 FL (ref 9.4–12.4)
POTASSIUM SERPL-SCNC: 4.6 MMOL/L (ref 3.5–5.1)
PROT SERPL-MCNC: 5.1 G/DL (ref 6.4–8.3)
RBC # BLD AUTO: 3 M/UL (ref 4.7–6.1)
SODIUM SERPL-SCNC: 140 MMOL/L (ref 136–145)
WBC # BLD AUTO: 25.9 K/UL (ref 4.8–10.8)

## 2025-03-23 PROCEDURE — 36415 COLL VENOUS BLD VENIPUNCTURE: CPT

## 2025-03-23 PROCEDURE — 85027 COMPLETE CBC AUTOMATED: CPT

## 2025-03-23 PROCEDURE — 94760 N-INVAS EAR/PLS OXIMETRY 1: CPT

## 2025-03-23 PROCEDURE — 80053 COMPREHEN METABOLIC PANEL: CPT

## 2025-03-23 PROCEDURE — 6370000000 HC RX 637 (ALT 250 FOR IP): Performed by: SURGERY

## 2025-03-23 PROCEDURE — 6370000000 HC RX 637 (ALT 250 FOR IP): Performed by: INTERNAL MEDICINE

## 2025-03-23 PROCEDURE — 6360000002 HC RX W HCPCS: Performed by: HOSPITALIST

## 2025-03-23 PROCEDURE — 6360000002 HC RX W HCPCS: Performed by: SURGERY

## 2025-03-23 PROCEDURE — 2500000003 HC RX 250 WO HCPCS: Performed by: SURGERY

## 2025-03-23 PROCEDURE — 1200000000 HC SEMI PRIVATE

## 2025-03-23 RX ORDER — MIDODRINE HYDROCHLORIDE 5 MG/1
2.5 TABLET ORAL
Status: DISCONTINUED | OUTPATIENT
Start: 2025-03-23 | End: 2025-03-24

## 2025-03-23 RX ORDER — HEPARIN SODIUM 5000 [USP'U]/ML
5000 INJECTION, SOLUTION INTRAVENOUS; SUBCUTANEOUS EVERY 8 HOURS SCHEDULED
Status: DISCONTINUED | OUTPATIENT
Start: 2025-03-23 | End: 2025-03-25 | Stop reason: HOSPADM

## 2025-03-23 RX ADMIN — SPIRONOLACTONE 25 MG: 25 TABLET ORAL at 08:21

## 2025-03-23 RX ADMIN — SODIUM CHLORIDE, PRESERVATIVE FREE 10 ML: 5 INJECTION INTRAVENOUS at 08:23

## 2025-03-23 RX ADMIN — IRON SUCROSE 200 MG: 20 INJECTION, SOLUTION INTRAVENOUS at 10:28

## 2025-03-23 RX ADMIN — HEPARIN SODIUM 5000 UNITS: 5000 INJECTION INTRAVENOUS; SUBCUTANEOUS at 20:24

## 2025-03-23 RX ADMIN — TACROLIMUS 1 MG: 1 CAPSULE ORAL at 08:22

## 2025-03-23 RX ADMIN — HEPARIN SODIUM 5000 UNITS: 5000 INJECTION INTRAVENOUS; SUBCUTANEOUS at 14:21

## 2025-03-23 RX ADMIN — MIDODRINE HYDROCHLORIDE 2.5 MG: 5 TABLET ORAL at 17:33

## 2025-03-23 RX ADMIN — MIDODRINE HYDROCHLORIDE 2.5 MG: 5 TABLET ORAL at 12:52

## 2025-03-23 RX ADMIN — TACROLIMUS 1 MG: 1 CAPSULE ORAL at 20:24

## 2025-03-23 RX ADMIN — POTASSIUM CHLORIDE 40 MEQ: 1500 TABLET, EXTENDED RELEASE ORAL at 08:21

## 2025-03-23 RX ADMIN — SODIUM CHLORIDE, PRESERVATIVE FREE 10 ML: 5 INJECTION INTRAVENOUS at 20:24

## 2025-03-23 NOTE — PLAN OF CARE
Problem: Safety - Adult  Goal: Free from fall injury  Outcome: Progressing  Flowsheets (Taken 3/22/2025 2244)  Free From Fall Injury: Instruct family/caregiver on patient safety     Problem: Pain  Goal: Verbalizes/displays adequate comfort level or baseline comfort level  Outcome: Progressing

## 2025-03-24 LAB
ALBUMIN SERPL-MCNC: 2.5 G/DL (ref 3.5–5.2)
ALP SERPL-CCNC: 57 U/L (ref 40–129)
ALT SERPL-CCNC: 7 U/L (ref 10–50)
ANION GAP SERPL CALCULATED.3IONS-SCNC: 11 MMOL/L (ref 8–16)
AST SERPL-CCNC: 12 U/L (ref 10–50)
BILIRUB SERPL-MCNC: <0.2 MG/DL (ref 0.2–1.2)
BUN SERPL-MCNC: 23 MG/DL (ref 6–20)
CALCIUM SERPL-MCNC: 7.3 MG/DL (ref 8.6–10)
CHLORIDE SERPL-SCNC: 110 MMOL/L (ref 98–107)
CO2 SERPL-SCNC: 21 MMOL/L (ref 22–29)
CREAT SERPL-MCNC: 4.2 MG/DL (ref 0.7–1.2)
ERYTHROCYTE [DISTWIDTH] IN BLOOD BY AUTOMATED COUNT: 14.6 % (ref 11.5–14.5)
GLUCOSE SERPL-MCNC: 107 MG/DL (ref 70–99)
HCT VFR BLD AUTO: 27.5 % (ref 42–52)
HGB BLD-MCNC: 8.6 G/DL (ref 14–18)
MCH RBC QN AUTO: 30.6 PG (ref 27–31)
MCHC RBC AUTO-ENTMCNC: 31.3 G/DL (ref 33–37)
MCV RBC AUTO: 97.9 FL (ref 80–94)
PLATELET # BLD AUTO: 473 K/UL (ref 130–400)
PMV BLD AUTO: 10.5 FL (ref 9.4–12.4)
POTASSIUM SERPL-SCNC: 3.4 MMOL/L (ref 3.5–5.1)
PROT SERPL-MCNC: 5.1 G/DL (ref 6.4–8.3)
RBC # BLD AUTO: 2.81 M/UL (ref 4.7–6.1)
SODIUM SERPL-SCNC: 142 MMOL/L (ref 136–145)
WBC # BLD AUTO: 35.9 K/UL (ref 4.8–10.8)

## 2025-03-24 PROCEDURE — 6360000002 HC RX W HCPCS: Performed by: SURGERY

## 2025-03-24 PROCEDURE — 6360000002 HC RX W HCPCS: Performed by: HOSPITALIST

## 2025-03-24 PROCEDURE — 8010000000 HC HEMODIALYSIS ACUTE INPT

## 2025-03-24 PROCEDURE — 94760 N-INVAS EAR/PLS OXIMETRY 1: CPT

## 2025-03-24 PROCEDURE — 1200000000 HC SEMI PRIVATE

## 2025-03-24 PROCEDURE — 6370000000 HC RX 637 (ALT 250 FOR IP): Performed by: HOSPITALIST

## 2025-03-24 PROCEDURE — 85027 COMPLETE CBC AUTOMATED: CPT

## 2025-03-24 PROCEDURE — 2500000003 HC RX 250 WO HCPCS: Performed by: SURGERY

## 2025-03-24 PROCEDURE — 6370000000 HC RX 637 (ALT 250 FOR IP): Performed by: SURGERY

## 2025-03-24 PROCEDURE — 80053 COMPREHEN METABOLIC PANEL: CPT

## 2025-03-24 PROCEDURE — 36415 COLL VENOUS BLD VENIPUNCTURE: CPT

## 2025-03-24 PROCEDURE — 6370000000 HC RX 637 (ALT 250 FOR IP): Performed by: INTERNAL MEDICINE

## 2025-03-24 RX ORDER — PANTOPRAZOLE SODIUM 40 MG/1
40 TABLET, DELAYED RELEASE ORAL
Status: DISCONTINUED | OUTPATIENT
Start: 2025-03-25 | End: 2025-03-25 | Stop reason: HOSPADM

## 2025-03-24 RX ORDER — NIFEDIPINE 30 MG/1
90 TABLET, EXTENDED RELEASE ORAL DAILY
Status: DISCONTINUED | OUTPATIENT
Start: 2025-03-25 | End: 2025-03-24

## 2025-03-24 RX ORDER — NIFEDIPINE 30 MG/1
60 TABLET, EXTENDED RELEASE ORAL DAILY
Status: DISCONTINUED | OUTPATIENT
Start: 2025-03-25 | End: 2025-03-25

## 2025-03-24 RX ORDER — HYDRALAZINE HYDROCHLORIDE 50 MG/1
50 TABLET, FILM COATED ORAL EVERY 8 HOURS SCHEDULED
Status: DISCONTINUED | OUTPATIENT
Start: 2025-03-24 | End: 2025-03-25

## 2025-03-24 RX ORDER — METOPROLOL TARTRATE 25 MG/1
25 TABLET, FILM COATED ORAL 2 TIMES DAILY
Status: DISCONTINUED | OUTPATIENT
Start: 2025-03-24 | End: 2025-03-25 | Stop reason: HOSPADM

## 2025-03-24 RX ORDER — SPIRONOLACTONE 25 MG/1
25 TABLET ORAL DAILY
Status: DISCONTINUED | OUTPATIENT
Start: 2025-03-24 | End: 2025-03-25 | Stop reason: HOSPADM

## 2025-03-24 RX ADMIN — IRON SUCROSE 200 MG: 20 INJECTION, SOLUTION INTRAVENOUS at 12:47

## 2025-03-24 RX ADMIN — METOPROLOL TARTRATE 25 MG: 25 TABLET, FILM COATED ORAL at 12:47

## 2025-03-24 RX ADMIN — HEPARIN SODIUM 5000 UNITS: 5000 INJECTION INTRAVENOUS; SUBCUTANEOUS at 20:08

## 2025-03-24 RX ADMIN — TACROLIMUS 1 MG: 1 CAPSULE ORAL at 12:47

## 2025-03-24 RX ADMIN — SPIRONOLACTONE 25 MG: 25 TABLET ORAL at 13:40

## 2025-03-24 RX ADMIN — SODIUM CHLORIDE, PRESERVATIVE FREE 10 ML: 5 INJECTION INTRAVENOUS at 20:07

## 2025-03-24 RX ADMIN — HEPARIN SODIUM 5000 UNITS: 5000 INJECTION INTRAVENOUS; SUBCUTANEOUS at 12:47

## 2025-03-24 RX ADMIN — HEPARIN SODIUM 5000 UNITS: 5000 INJECTION INTRAVENOUS; SUBCUTANEOUS at 05:19

## 2025-03-24 RX ADMIN — POTASSIUM CHLORIDE 40 MEQ: 1500 TABLET, EXTENDED RELEASE ORAL at 05:39

## 2025-03-24 RX ADMIN — SODIUM CHLORIDE, PRESERVATIVE FREE 10 ML: 5 INJECTION INTRAVENOUS at 12:47

## 2025-03-24 RX ADMIN — NIFEDIPINE 60 MG: 30 TABLET, EXTENDED RELEASE ORAL at 12:46

## 2025-03-24 RX ADMIN — HYDRALAZINE HYDROCHLORIDE 50 MG: 50 TABLET ORAL at 20:07

## 2025-03-24 RX ADMIN — METOPROLOL TARTRATE 25 MG: 25 TABLET, FILM COATED ORAL at 20:08

## 2025-03-24 RX ADMIN — HYDRALAZINE HYDROCHLORIDE 50 MG: 50 TABLET ORAL at 14:19

## 2025-03-24 RX ADMIN — TACROLIMUS 1 MG: 1 CAPSULE ORAL at 20:15

## 2025-03-24 NOTE — PLAN OF CARE
Problem: Discharge Planning  Goal: Discharge to home or other facility with appropriate resources  3/23/2025 2233 by Janice Chen, RN  Outcome: Progressing  3/23/2025 1702 by Ermelinda Hathaway, RN  Outcome: Progressing     Problem: Safety - Adult  Goal: Free from fall injury  3/23/2025 2233 by Janice Chen, RN  Outcome: Progressing  3/23/2025 1702 by Ermelinda Hathaway, RN  Outcome: Progressing     Problem: Pain  Goal: Verbalizes/displays adequate comfort level or baseline comfort level  3/23/2025 2233 by Janice Chen, RN  Outcome: Progressing  3/23/2025 1702 by Ermelinda Hathaway, RN  Outcome: Progressing

## 2025-03-24 NOTE — DIALYSIS
[facility]  DIALYSIS TREATMENT SUMMARY      Note: Consult with the attending physician for patient treatment orders, this document is not a physician order.      Patient Information   Patient ROBERT SALCIDO   Date of Birth January 14, 1990   Chart Number [id number]   Location [facility] MRN [id number]   Gender Male   SSN (last 4) [id number]     Treatment Information   Treatment Type Hemodialysis   Treatment Id [id number]   Start Time March 24, 2025 08:48   End Time March 24, 2025 12:23   Acutal Duration 03:35     Treatment Balances   Total Saline Administered 500   Net Fluid Balance 2000    Hemodialysis Orders   Therapy Standard   Orders Verified Time 03/24/2025 08:00    Date Verified 03/24/2025   Duration 3:30   Isolated UF/Bypass No   BFR (mL) 400   DFR (mL) 600   Dialyzer Type OPTIFLUX 160NR   UF Order UF Goal Ordered   UF Goal Ordered (mL) 2000   Crit-Line used No   Heparin Initial Units Bolus No   Heparin IV Maintenance Bolus No   Heparin IV Infusion No   Potassium (mEq/L) 3   Calcium (mEq/L) 2.5   Bicarb (mg/dL) 35   Sodium (mEq/L) 138   Clinician Liz Downs RN    Dialysis Access   Access Type Central Venous Catheter   Central Venous Catheter   Access Type Catheter - Tunneled   Date Central Venous Catheter Placed 03/21/2025   Access Location Chest Wall - R   Surgeon DR. DAVIS   1st Use Catheter Verified by Previous Use   Catheter Care Completed per Policy Yes   Dressing Dry and Intact on Arrival Yes   Dressing Changed No   Type of Dressing Film Biopatch/CHG   Last Date Changed 03/24/2025   If No select Reason Dressing Change Not Indicated per Policy   Type of HD Caps Curos   HD Caps Changed Yes   CVC Line Education Provided Yes - Infection Prevention      Vitals   Pre-Treatment Vitals   Time Is BP being recorded? Pre BP Map BP Method Pulse RR Temp How was Weight Obtained? Pre Weight Previous Dry Weight Previous Post Weight Metric Target Fluid Removal (mL) 
by   Pain Screening Heart Rhythm Regular Post Treatment Focused Assessment Completed by Malou Louis RN   Does the patient have pain? No Telemetry Yes Date 03/22/2025   Respiratory Skin Time 11:20   Lung Sounds Clear Skin Warm Signing   Location Bilateral  Dry Signed By Malou Louis RN

## 2025-03-25 VITALS
RESPIRATION RATE: 16 BRPM | OXYGEN SATURATION: 96 % | DIASTOLIC BLOOD PRESSURE: 67 MMHG | BODY MASS INDEX: 28.48 KG/M2 | SYSTOLIC BLOOD PRESSURE: 118 MMHG | HEIGHT: 67 IN | WEIGHT: 181.44 LBS | HEART RATE: 96 BPM | TEMPERATURE: 98.6 F

## 2025-03-25 LAB
ALBUMIN SERPL-MCNC: 2.5 G/DL (ref 3.5–5.2)
ALP SERPL-CCNC: 56 U/L (ref 40–129)
ALT SERPL-CCNC: 8 U/L (ref 10–50)
ANION GAP SERPL CALCULATED.3IONS-SCNC: 9 MMOL/L (ref 8–16)
AST SERPL-CCNC: 14 U/L (ref 10–50)
BACTERIA BLD CULT ORG #2: NORMAL
BACTERIA BLD CULT: NORMAL
BASOPHILS # BLD: 0.1 K/UL (ref 0–0.2)
BASOPHILS NFR BLD: 0.5 % (ref 0–1)
BILIRUB SERPL-MCNC: <0.2 MG/DL (ref 0.2–1.2)
BUN SERPL-MCNC: 14 MG/DL (ref 6–20)
CALCIUM SERPL-MCNC: 7.2 MG/DL (ref 8.6–10)
CHLORIDE SERPL-SCNC: 107 MMOL/L (ref 98–107)
CO2 SERPL-SCNC: 26 MMOL/L (ref 22–29)
CREAT SERPL-MCNC: 3.1 MG/DL (ref 0.7–1.2)
EOSINOPHIL # BLD: 0.2 K/UL (ref 0–0.6)
EOSINOPHIL NFR BLD: 0.9 % (ref 0–5)
ERYTHROCYTE [DISTWIDTH] IN BLOOD BY AUTOMATED COUNT: 14.7 % (ref 11.5–14.5)
GLUCOSE SERPL-MCNC: 88 MG/DL (ref 70–99)
HCT VFR BLD AUTO: 29.6 % (ref 42–52)
HGB BLD-MCNC: 9.2 G/DL (ref 14–18)
IMM GRANULOCYTES # BLD: 1 K/UL
LYMPHOCYTES # BLD: 5 K/UL (ref 1.1–4.5)
LYMPHOCYTES NFR BLD: 20.4 % (ref 20–40)
MAGNESIUM SERPL-MCNC: 1.6 MG/DL (ref 1.6–2.6)
MCH RBC QN AUTO: 30.2 PG (ref 27–31)
MCHC RBC AUTO-ENTMCNC: 31.1 G/DL (ref 33–37)
MCV RBC AUTO: 97 FL (ref 80–94)
MONOCYTES # BLD: 1.7 K/UL (ref 0–0.9)
MONOCYTES NFR BLD: 7 % (ref 0–10)
NEUTROPHILS # BLD: 16.4 K/UL (ref 1.5–7.5)
NEUTS SEG NFR BLD: 67.3 % (ref 50–65)
PLATELET # BLD AUTO: 449 K/UL (ref 130–400)
PMV BLD AUTO: 10 FL (ref 9.4–12.4)
POTASSIUM SERPL-SCNC: 3.5 MMOL/L (ref 3.5–5)
PROT SERPL-MCNC: 5 G/DL (ref 6.4–8.3)
RBC # BLD AUTO: 3.05 M/UL (ref 4.7–6.1)
SODIUM SERPL-SCNC: 142 MMOL/L (ref 136–145)
WBC # BLD AUTO: 24.4 K/UL (ref 4.8–10.8)

## 2025-03-25 PROCEDURE — 94760 N-INVAS EAR/PLS OXIMETRY 1: CPT

## 2025-03-25 PROCEDURE — 6370000000 HC RX 637 (ALT 250 FOR IP): Performed by: SURGERY

## 2025-03-25 PROCEDURE — 36415 COLL VENOUS BLD VENIPUNCTURE: CPT

## 2025-03-25 PROCEDURE — 6370000000 HC RX 637 (ALT 250 FOR IP): Performed by: HOSPITALIST

## 2025-03-25 PROCEDURE — 6360000002 HC RX W HCPCS: Performed by: SURGERY

## 2025-03-25 PROCEDURE — 83735 ASSAY OF MAGNESIUM: CPT

## 2025-03-25 PROCEDURE — 6360000002 HC RX W HCPCS: Performed by: HOSPITALIST

## 2025-03-25 PROCEDURE — 80053 COMPREHEN METABOLIC PANEL: CPT

## 2025-03-25 PROCEDURE — 85025 COMPLETE CBC W/AUTO DIFF WBC: CPT

## 2025-03-25 RX ORDER — NIFEDIPINE 30 MG/1
30 TABLET, EXTENDED RELEASE ORAL DAILY
Status: DISCONTINUED | OUTPATIENT
Start: 2025-03-26 | End: 2025-03-25 | Stop reason: HOSPADM

## 2025-03-25 RX ORDER — METOPROLOL TARTRATE 25 MG/1
25 TABLET, FILM COATED ORAL 2 TIMES DAILY
Qty: 60 TABLET | Refills: 3 | Status: SHIPPED | OUTPATIENT
Start: 2025-03-25

## 2025-03-25 RX ORDER — HYDRALAZINE HYDROCHLORIDE 50 MG/1
25 TABLET, FILM COATED ORAL EVERY 8 HOURS SCHEDULED
Status: DISCONTINUED | OUTPATIENT
Start: 2025-03-25 | End: 2025-03-25 | Stop reason: HOSPADM

## 2025-03-25 RX ORDER — NIFEDIPINE 30 MG/1
30 TABLET, EXTENDED RELEASE ORAL DAILY
Qty: 30 TABLET | Refills: 3 | Status: SHIPPED | OUTPATIENT
Start: 2025-03-26

## 2025-03-25 RX ADMIN — HEPARIN SODIUM 5000 UNITS: 5000 INJECTION INTRAVENOUS; SUBCUTANEOUS at 05:20

## 2025-03-25 RX ADMIN — PANTOPRAZOLE SODIUM 40 MG: 40 TABLET, DELAYED RELEASE ORAL at 05:18

## 2025-03-25 RX ADMIN — POTASSIUM CHLORIDE 40 MEQ: 1500 TABLET, EXTENDED RELEASE ORAL at 05:18

## 2025-03-25 RX ADMIN — TACROLIMUS 1 MG: 1 CAPSULE ORAL at 08:59

## 2025-03-25 NOTE — CARE COORDINATION
03/24/25 1720   Service Assessment   Patient Orientation Alert and Oriented;Person;Place;Situation   Cognition Alert   History Provided By Patient   Primary Caregiver Self   Accompanied By/Relationship N/A   Support Systems Spouse/Significant Other;Family Members   Patient's Healthcare Decision Maker is: Legal Next of Kin   PCP Verified by CM Yes   Last Visit to PCP Within last 3 months   Prior Functional Level Independent in ADLs/IADLs   Current Functional Level Independent in ADLs/IADLs   Can patient return to prior living arrangement Yes   Ability to make needs known: Good   Family able to assist with home care needs: Yes   Would you like for me to discuss the discharge plan with any other family members/significant others, and if so, who? Yes  (okay to speak with spouse)   Financial Resources Medicaid  ([facility])   Community Resources None   CM/SW Referral Other (see comment)  (following for d/c planning; and new HD set-up needs)   Social/Functional History   Lives With Spouse   Type of Home House   Home Layout One level   Home Access Stairs to enter with rails   Entrance Stairs - Number of Steps 4   Entrance Stairs - Rails Right   Bathroom Shower/Tub Tub/Shower unit   Bathroom Toilet Standard   Bathroom Equipment None   Bathroom Accessibility Not accessible   Home Equipment None   Receives Help From Family   Prior Level of Assist for ADLs Independent   Prior Level of Assist for Homemaking Independent   Homemaking Responsibilities Yes   Ambulation Assistance Independent   Prior Level of Assist for Transfers Independent   Active  Yes   Mode of Transportation Truck   Education N/A   Occupation On disability   Type of Occupation N/A   Discharge Planning   Type of Residence House   Living Arrangements Spouse/Significant Other   Current Services Prior To Admission None   Potential Assistance Needed Other (Comment)  (new HD set-up)   DME Ordered? No   Potential Assistance Purchasing 
Pt currently in HD; will f/u for assessment     Electronically signed by BLAIR Woo on 3/24/2025 at 11:49 AM    
Pti s a 16% risk of readmission; SW attempted visit for assessment, Pt is in OR. Will f/u    Electronically signed by BLAIR Woo on 3/21/2025 at 4:29 PM    
Referral has been entered into [facility] intake portal; awaiting chair time (Pt's permcath was placed late Friday afternoon)  [facility]  [phone]  [phone]  Electronically signed by BLAIR Woo on 3/24/2025 at 11:44 AM  
SW received schedule letter for Pt's chair; [facility] MWF @1820; first tx 3/26/25.  Electronically signed by BLAIR Woo on 3/24/2025 at 4:32 PM  
House  Plan for transportation at discharge:      Financial    [place] [employer] [place] / [place] [employer] [place] / Product Type: *No Product type* /     Does insurance require precert for SNF: No    Potential assistance Purchasing Medications: Yes  Meds-to-Beds request: Yes      [facility] - [place], [place] - [address] - P [phone] - F [phone]  [address]  [facility] [address]  Phone: [phone] Fax: [phone]      Notes:    Factors facilitating achievement of predicted outcomes: Family support and Cooperative    Barriers to discharge: Medical complications    Additional Case Management Notes: Pt is from home; plans to return; new HD set-up; [facility] MWF @ 1400; first run Wednesday    The Plan for Transition of Care is related to the following treatment goals of Hypokalemia [E87.6]  Hypomagnesemia [E83.42]  KEN (acute kidney injury) [N17.9]  Acute kidney injury superimposed on CKD [N17.9, N18.9]    IF APPLICABLE: The Patient and/or patient representative Khalif and his family were provided with a choice of provider and agrees with the discharge plan. Freedom of choice list with basic dialogue that supports the patient's individualized plan of care/goals and shares the quality data associated with the providers was provided to:     Patient Representative Name:       The Patient and/or Patient Representative Agree with the Discharge Plan?      BLAIR Woo  Case Management Department  Ph: [phone] Fax: [phone]  Electronically signed by BLAIR Woo on 3/25/2025 at 5:07 PM

## 2025-03-25 NOTE — DISCHARGE SUMMARY
Discharge Summary    Date:3/25/2025        Patient Name:Khalif Trotter     YOB: 1990     Age:35 y.o.    Admit Date:3/20/2025   Admission Condition:fair   Discharged Condition:stable  Discharge Date: 03/25/25       Discharge Diagnoses   Principal Problem:    KEN (acute kidney injury)  Resolved Problems:    * No resolved hospital problems. *      Hospital Stay   Narrative of Hospital Course:     35-year-old male with a history of idiopathic membranous, nephritis status post renal biopsy, treated with combination of tacrolimus and Acthar gel now with relapse, presenting with worsening renal function and proteinuria. On admission was given IV fluids, electrolyte replaced, no improvement at all in renal function, was seen by nephrologist, Vascular surgery consulted for PermCath placement and initiated on dialysis 3/21/25. Patient tolerated dialysis in house, volume status significantly improved.  Will set up for outpatient dialysis to resume tomorrow 3/20/25.  Patient to follow-up with PCP for continued management of chronic medical problems.      Physical Examination:  General: Well-developed, no acute distress lying comfortably in bed.  HEENT: Atraumatic normocephalic, range of motion normal, no JVD, no tracheal deviation noted.  Chest: Right IJ tunneled catheter  Cardiac: Normal S1-S2 no murmurs rub or gallop.  Respiratory: clear To auscultation bilaterally, no rhonchi or rales, no wheezing  Abdomen: Soft, positive bowel sounds in all quadrants, no distention, nontender to palpation, no rebound noted.    Extremities: no tenderness, moves all extremities  Psych: Affect normal and good eye contact, behavioral normal.      Consultants:   IP CONSULT TO NEPHROLOGY  IP CONSULT TO NEPHROLOGY  IP CONSULT TO VASCULAR SURGERY    Time Spent on Discharge:  35 minutes were spent in patient examination, evaluation, counseling as well as medication reconciliation, prescriptions for required medications, discharge plan

## 2025-03-25 NOTE — PROGRESS NOTES
Pharmacy Automatic Dose Adjustment                Subcutaneous Anticoagulant for Prophylaxis    Khalif Trotter is a 35 y.o. male.     Recent Labs     03/21/25  0253 03/22/25  0309   CREATININE 5.1* 2.8*       Estimated Creatinine Clearance: 39 mL/min (A) (based on SCr of 2.8 mg/dL (H)).    Weight:  Wt Readings from Last 1 Encounters:   03/21/25 85.9 kg (189 lb 6 oz)           Pharmacy has adjusted subcutaneous anticoagulant for prophylaxis to Enoxaparin 40 mg SC daily based on the patient's weight and estimated CrCl per [facility] policy.               Electronically signed by Oni Acevedo RPH on 3/22/2025 at 10:04 AM   
       Pharmacy Automatic Dose Adjustment                Subcutaneous Anticoagulant for Prophylaxis    Khalif Trotter is a 35 y.o. male.     Recent Labs     03/22/25  0309 03/23/25  0248   CREATININE 2.8* 3.2*       Estimated Creatinine Clearance: 34 mL/min (A) (based on SCr of 3.2 mg/dL (H)).    Weight:  Wt Readings from Last 1 Encounters:   03/21/25 85.9 kg (189 lb 6 oz)           Pharmacy has adjusted subcutaneous anticoagulant for prophylaxis to Heparin 5000 units SC three times daily based on the patient's weight and estimated CrCl per [facility] policy.               Electronically signed by Oni Acevedo RPH on 3/23/2025 at 10:23 AM   
4 Eyes Skin Assessment     NAME:  Khalif Trotter  YOB: 1990  MEDICAL RECORD NUMBER:  [id number]    The patient is being assessed for  Admission    I agree that at least one RN has performed a thorough Head to Toe Skin Assessment on the patient. ALL assessment sites listed below have been assessed.      Areas assessed by both nurses:    Head, Face, Ears, Shoulders, Back, Chest, Arms, Elbows, Hands, Sacrum. Buttock, Coccyx, Ischium, and Legs. Feet and Heels        Does the Patient have a Wound? No noted wound(s)       King Prevention initiated by RN: No  Wound Care Orders initiated by RN: No    Pressure Injury (Stage 3,4, Unstageable, DTI, NWPT, and Complex wounds) if present, place Wound referral order by RN under : No    New Ostomies, if present place, Ostomy referral order under : No     Nurse 1 eSignature: Electronically signed by Torrie Maynard RN on 3/20/25 at 10:14 PM CDT    **SHARE this note so that the co-signing nurse can place an eSignature**    Nurse 2 eSignature: Electronically signed by Lisa Trotter LPN on 3/20/25 at 10:20 PM CDT   
CRNA at bedside regarding BP  
Khalif Trotter arrived to room # 319  .   Presented with: KEN  Mental Status: Patient is oriented and alert.   Vitals:    03/20/25 2200   BP: (!) 189/128   Pulse:    Resp:    Temp:    SpO2:      Patient safety contract and falls prevention contract reviewed with patient Yes.  Oriented Patient to room.  Call light within reach. Yes.  Needs, issues or concerns expressed at this time: no.      Electronically signed by Torrie Maynard RN on 3/20/2025 at 10:14 PM  
Nephrology ([facility]) Progress Note    Patient:  Khalif Trotter  YOB: 1990  Date of Service: 3/25/2025  MRN: [id number]   Acct: [id number]   Primary Care Physician: Jamie Doherty MD  Advance Directive: Full Code  Admit Date: 3/20/2025       Hospital Day: 5  Referring Provider: Ana Morrell MD    Patient independently seen and examined, Chart, Consults, Notes, Operative notes, Labs, Cardiology, and Radiology studies reviewed as available.    Subjective:  Khalif Trotter is a 35 y.o. male for whom we were consulted for evaluation and treatment of acute kidney injury with chronic kidney disease. 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 Due to lack of insurance coverage, he was unable to get Rituxan or Cytoxan. He was treated with combination of tacrolimus and Acthar gel. He had an episode of remission followed by recent relapse with no improvement in his proteinuria. Due to increasing edema, he required high dose of loop diuretics as well as Aldactone. Patient also experienced persistent hypokalemia despite replacement of potassium. He was directed to go to the emergency room where his creatinine now was more than 5 and potassium was only 2.2. Patient denied any nausea or vomiting. He also denied any use of nonsteroidal agents. He was reporting profound fatigue, lack of energy. On March 21, he underwent permacatheter placement followed by first hemodialysis treatment.     Today, no overnight events.  Seen with family.  Hopeful for discharge soon.  No new complaints upon questioning.  Denied chest pain, shortness of air at rest, nausea or vomiting    Allergies:  Sulfa antibiotics    Medicines:  Current Facility-Administered Medications   Medication Dose Route Frequency Provider Last Rate Last Admin    hydrALAZINE (APRESOLINE) tablet 25 mg  25 mg Oral 3 times per day Ana Morrell MD        [START ON 3/26/2025] NIFEdipine 
Nephrology ([facility]) Progress Note      Patient:  Khalif Trotter  YOB: 1990  Date of Service: 3/22/2025  MRN: [id number]   Acct: [id number]   Primary Care Physician: Severo Hayward, APRN - CNP  Advance Directive: Full Code  Admit Date: 3/20/2025       Hospital Day: 2  Referring Provider: Ana Morrell MD    Patient independently seen and examined, Chart, Consults, Notes, Operative notes, Labs, Cardiology, and Radiology studies reviewed as available.    Chief complaint: Abnormal labs.    Subjective:  Khalif Trotter is a 35 y.o. male for whom we were consulted for evaluation and treatment of acute kidney injury/chronic kidney disease.  Patient was diagnosed with idiopathic membranous glomerulonephritis, status post renal biopsy.  Due to lack of coverage, he was unable to get Rituxan or Cytoxan.  He was treated with combination of tacrolimus and Acthar gel.  He had episode of remission followed by recent relapse with no improvement in his proteinuria.  Due to increasing edema he required high dose of loop diuretics as well as Aldactone.  Patient also experienced persistent hypokalemia despite replacement of potassium.  He was directed to go to the emergency room his creatinine now is more than 5 mg and potassium is only 2.2 minimal.  Patient denies any nausea or vomiting.  He also denies any use of nonsteroidal agents.  He is reporting profound fatigue, lack of energy.  On March 21 he underwent permacatheter placement followed by first hemodialysis treatment.    This morning he is feeling much better.  He is getting second hemodialysis treatment today.    Currently seen on hemodialysis  Hemodialysis: 3 hours  Ultrafiltration: 2000 cc  4K bath  Dialysis Access: Permacath  Blood flow rate is 350 cc/min.    Allergies:  Sulfa antibiotics    Medicines:  Current Facility-Administered Medications   Medication Dose Route Frequency Provider Last Rate Last Admin    enoxaparin (LOVENOX) 
Nephrology ([facility]) Progress Note      Patient:  Khalif Trotter  YOB: 1990  Date of Service: 3/23/2025  MRN: [id number]   Acct: [id number]   Primary Care Physician: Severo Hayward, APRN - CNP  Advance Directive: Full Code  Admit Date: 3/20/2025       Hospital Day: 3  Referring Provider: Ana Morrell MD    Patient independently seen and examined, Chart, Consults, Notes, Operative notes, Labs, Cardiology, and Radiology studies reviewed as available.    Chief complaint: Abnormal labs.    Subjective:  Khalif Trotter is a 35 y.o. male for whom we were consulted for evaluation and treatment of acute kidney injury/chronic kidney disease.  Patient was diagnosed with idiopathic membranous glomerulonephritis, status post renal biopsy.  Due to lack of coverage, he was unable to get Rituxan or Cytoxan.  He was treated with combination of tacrolimus and Acthar gel.  He had episode of remission followed by recent relapse with no improvement in his proteinuria.  Due to increasing edema he required high dose of loop diuretics as well as Aldactone.  Patient also experienced persistent hypokalemia despite replacement of potassium.  He was directed to go to the emergency room his creatinine now is more than 5 mg and potassium is only 2.2 minimal.  Patient denies any nausea or vomiting.  He also denies any use of nonsteroidal agents.  He is reporting profound fatigue, lack of energy.  On March 21 he underwent permacatheter placement followed by first hemodialysis treatment.    This morning patient is feeling much better.  He has been walking around in the hallways, denies any shortness of breath.    Allergies:  Sulfa antibiotics    Medicines:  Current Facility-Administered Medications   Medication Dose Route Frequency Provider Last Rate Last Admin    spironolactone (ALDACTONE) tablet 25 mg  25 mg Oral Daily Jared Arambula MD   25 mg at 03/23/25 0821    tacrolimus (PROGRAF) capsule 1 mg  1 mg 
Progress Note    Date:3/22/2025       Room:0319/319-02  Patient Name:Khalif Trotter     YOB: 1990     Age:35 y.o.      Subjective    Subjective: 35-year-old male with a history of idiopathic membranous, nephritis status post renal biopsy, treated with combination of tacrolimus and Acthar gel now with relapse, presenting with worsening renal function and proteinuria.  On admission was given IV fluids, electrolyte replaced, no improvement at all in renal function, was seen by nephrologist, Vascular surgery consulted for PermCath placement and initiated on dialysis 3/21/25.    Seen in house today in dialysis, no acute overnight events, feels a lot better, tolerating dialysis without any issues. Outpt dialysis set up initiated.     Review of Systems: 10 point system review negative septa stated above.    Objective         Vitals Last 24 Hours:  TEMPERATURE:  Temp  Av.9 °F (36.6 °C)  Min: 96.8 °F (36 °C)  Max: 99 °F (37.2 °C)  RESPIRATIONS RANGE: Resp  Av.7  Min: 14  Max: 22  PULSE OXIMETRY RANGE: SpO2  Av.2 %  Min: 91 %  Max: 99 %  PULSE RANGE: Pulse  Av.9  Min: 71  Max: 96  BLOOD PRESSURE RANGE: Systolic (24hrs), Av , Min:76 , Max:149   ; Diastolic (24hrs), Av, Min:44, Max:87    I/O (24Hr):    Intake/Output Summary (Last 24 hours) at 3/22/2025 1114  Last data filed at 3/21/2025 2251  Gross per 24 hour   Intake 1150 ml   Output 3000 ml   Net -1850 ml       Physical Examination:  General: Well-developed, no acute distress lying comfortably in bed.  HEENT: Atraumatic normocephalic, range of motion normal, no JVD, no tracheal deviation noted.  Cardiac: Normal S1-S2 no murmurs rub or gallop.  Respiratory: clear To auscultation bilaterally, no rhonchi or rales, no wheezing  Abdomen: Soft, positive bowel sounds in all quadrants, no distention, nontender to palpation, no rebound noted.    extremities: no tenderness,+ edema, moves all extremities  Psych: Affect normal and good eye 
Progress Note    Date:3/23/2025       Room:0319/319-02  Patient Name:Khalif Trotter     YOB: 1990     Age:35 y.o.      Subjective    Subjective: 35-year-old male with a history of idiopathic membranous, nephritis status post renal biopsy, treated with combination of tacrolimus and Acthar gel now with relapse, presenting with worsening renal function and proteinuria.  On admission was given IV fluids, electrolyte replaced, no improvement at all in renal function, was seen by nephrologist, Vascular surgery consulted for PermCath placement and initiated on dialysis 3/21/25.    Seen in house today with family present, no acute overnight events, feels a lot better, tolerating dialysis without any issues. Outpt dialysis set up initiated. BP a lot better, discontinued BP meds that were started in hospital.      Review of Systems: 10 point system review negative septa stated above.    Objective         Vitals Last 24 Hours:  TEMPERATURE:  Temp  Av.1 °F (36.7 °C)  Min: 97.7 °F (36.5 °C)  Max: 98.6 °F (37 °C)  RESPIRATIONS RANGE: Resp  Av.6  Min: 16  Max: 18  PULSE OXIMETRY RANGE: SpO2  Av.8 %  Min: 96 %  Max: 99 %  PULSE RANGE: Pulse  Av.8  Min: 75  Max: 99  BLOOD PRESSURE RANGE: Systolic (24hrs), Av , Min:94 , Max:126   ; Diastolic (24hrs), Av, Min:57, Max:78    I/O (24Hr):    Intake/Output Summary (Last 24 hours) at 3/23/2025 1014  Last data filed at 3/22/2025 1119  Gross per 24 hour   Intake 500 ml   Output 2500 ml   Net -2000 ml       Physical Examination:  General: Well-developed, no acute distress lying comfortably in bed.  HEENT: Atraumatic normocephalic, range of motion normal, no JVD, no tracheal deviation noted.  Chest: Right IJ tunneled catheter  Cardiac: Normal S1-S2 no murmurs rub or gallop.  Respiratory: clear To auscultation bilaterally, no rhonchi or rales, no wheezing  Abdomen: Soft, positive bowel sounds in all quadrants, no distention, nontender to palpation, no 
Progress Note    Date:3/24/2025       Room:0319/319-02  Patient Name:Khalif Trotter     YOB: 1990     Age:35 y.o.      Subjective    Subjective: 35-year-old male with a history of idiopathic membranous, nephritis status post renal biopsy, treated with combination of tacrolimus and Acthar gel now with relapse, presenting with worsening renal function and proteinuria.  On admission was given IV fluids, electrolyte replaced, no improvement at all in renal function, was seen by nephrologist, Vascular surgery consulted for PermCath placement and initiated on dialysis 3/21/25.    Seen in house today in dialysis, no acute overnight events. Outpt dialysis set up initiated.      Review of Systems: 10 point system review negative septa stated above.    Objective         Vitals Last 24 Hours:  TEMPERATURE:  Temp  Av.8 °F (36.6 °C)  Min: 97.5 °F (36.4 °C)  Max: 98.1 °F (36.7 °C)  RESPIRATIONS RANGE: Resp  Av.8  Min: 17  Max: 18  PULSE OXIMETRY RANGE: SpO2  Av.5 %  Min: 96 %  Max: 100 %  PULSE RANGE: Pulse  Av.1  Min: 89  Max: 110  BLOOD PRESSURE RANGE: Systolic (24hrs), Av , Min:132 , Max:175   ; Diastolic (24hrs), Av, Min:63, Max:109    I/O (24Hr):    Intake/Output Summary (Last 24 hours) at 3/24/2025 1245  Last data filed at 3/23/2025 1412  Gross per 24 hour   Intake --   Output 400 ml   Net -400 ml       Physical Examination:  General: Well-developed, no acute distress lying comfortably in bed.  HEENT: Atraumatic normocephalic, range of motion normal, no JVD, no tracheal deviation noted.  Chest: Right IJ tunneled catheter  Cardiac: Normal S1-S2 no murmurs rub or gallop.  Respiratory: clear To auscultation bilaterally, no rhonchi or rales, no wheezing  Abdomen: Soft, positive bowel sounds in all quadrants, no distention, nontender to palpation, no rebound noted.    Extremities: no tenderness, moves all extremities  Psych: Affect normal and good eye contact, behavioral 
Spoke to Juanita with Dr. Lowe who will call the patient with the date and time for 2 week follow up  
calcifications.  No aortic or iliac aneurysm within limitations of noncontrast examination. Pelvis: No mass.  Bladder is normal. Bones/Soft Tissues: No fracture or lytic lesion.  Visualized abdominal wall soft tissues are normal.      1.  Moderate right pleural effusion with adjacent atelectatic lung. 2.  No urinary tract calculus or hydronephrosis. 3.  Atherosclerosis. 4.  Otherwise unremarkable noncontrast CT scan of the abdomen and pelvis.  All CT scans are performed using dose optimization techniques as appropriate to the performed exam and include at least one of the following: Automated exposure control, adjustment of the mA and/or kV according to size, and the use of iterative reconstruction technique.  ______________________________________ Electronically signed by: KAYLI OLIVA M.D. Date:     03/20/2025 Time:    18:30     XR CHEST PORTABLE  Result Date: 3/20/2025  EXAM:  CHEST ONE-VIEW  History:  Fatigue and leukocytosis  FINDINGS:  Normal cardiomediastinal contours.  Normal pulmonary vasculature.  Consolidative opacity in the right lung base. New 11/07/2015.  Left lung is clear.  No acute chest wall abnormality.      Impression:  Smoothly marginated opacity in the right base possibly elevated diaphragm or hernia new from CT 11/07/2015.  No interval study for comparison.  Consider chest CT.   ______________________________________ Electronically signed by: JAZZ MAJOR M.D. 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  Hepatic panel negative    Hypokalemia with metabolic 
COMPARISON: Noncontrast CT scan of the abdomen and pelvis dated 11/07/2015.  FINDINGS: Lower Thorax: Moderate right pleural effusion with adjacent atelectatic lung. No left pleural effusion.  No pericardial effusion.  Normal heart size. Liver: No mass. Normal morphology. Biliary: The gallbladder and bile ducts are normal. Pancreas: No mass or evidence of pancreatitis. No duct dilation. Spleen: No mass. No splenomegaly. Adrenals: No mass. Kidneys/Ureters: No renal mass.  No calculus or hydronephrosis. GI Tract: No bowel dilation.  No bowel wall thickening. Normal appendix. Peritoneal Cavity: No ascites. Retroperitoneum: No fluid collection. Lymph Nodes: No lymphadenopathy. Vasculature: There are aortic calcifications.  No aortic or iliac aneurysm within limitations of noncontrast examination. Pelvis: No mass.  Bladder is normal. Bones/Soft Tissues: No fracture or lytic lesion.  Visualized abdominal wall soft tissues are normal.      1.  Moderate right pleural effusion with adjacent atelectatic lung. 2.  No urinary tract calculus or hydronephrosis. 3.  Atherosclerosis. 4.  Otherwise unremarkable noncontrast CT scan of the abdomen and pelvis.  All CT scans are performed using dose optimization techniques as appropriate to the performed exam and include at least one of the following: Automated exposure control, adjustment of the mA and/or kV according to size, and the use of iterative reconstruction technique.  ______________________________________ Electronically signed by: KAYLI OLIVA M.D. Date:     03/20/2025 Time:    18:30     XR CHEST PORTABLE  Result Date: 3/20/2025  EXAM:  CHEST ONE-VIEW  History:  Fatigue and leukocytosis  FINDINGS:  Normal cardiomediastinal contours.  Normal pulmonary vasculature.  Consolidative opacity in the right lung base. New 11/07/2015.  Left lung is clear.  No acute chest wall abnormality.      Impression:  Smoothly marginated opacity in the right base possibly elevated diaphragm or

## 2025-04-01 ENCOUNTER — OFFICE VISIT (OUTPATIENT)
Dept: FAMILY MEDICINE CLINIC | Facility: CLINIC | Age: 35
End: 2025-04-01
Payer: COMMERCIAL

## 2025-04-01 VITALS
DIASTOLIC BLOOD PRESSURE: 68 MMHG | WEIGHT: 176.6 LBS | SYSTOLIC BLOOD PRESSURE: 110 MMHG | OXYGEN SATURATION: 99 % | HEART RATE: 80 BPM | BODY MASS INDEX: 27.72 KG/M2 | HEIGHT: 67 IN | TEMPERATURE: 97.2 F

## 2025-04-01 DIAGNOSIS — N17.9 AKI (ACUTE KIDNEY INJURY): Primary | ICD-10-CM

## 2025-04-01 DIAGNOSIS — Z45.2 ENCOUNTER FOR CARE RELATED TO VASCULAR ACCESS PORT: ICD-10-CM

## 2025-04-01 RX ORDER — SPIRONOLACTONE 50 MG/1
1 TABLET, FILM COATED ORAL DAILY
COMMUNITY

## 2025-04-01 RX ORDER — NIFEDIPINE 30 MG/1
1 TABLET, EXTENDED RELEASE ORAL DAILY
COMMUNITY
Start: 2025-03-26

## 2025-04-01 RX ORDER — REPOSITORY CORTICOTROPIN 80 [USP'U]/ML
INJECTION INTRAMUSCULAR; SUBCUTANEOUS
COMMUNITY

## 2025-04-01 RX ORDER — METOPROLOL TARTRATE 25 MG/1
1 TABLET, FILM COATED ORAL 2 TIMES DAILY
COMMUNITY
Start: 2025-03-25

## 2025-04-01 RX ORDER — TACROLIMUS 1 MG/1
1 CAPSULE ORAL
COMMUNITY

## 2025-04-01 NOTE — PROGRESS NOTES
"Subjective   Chief Complaint:  Reevaluation after hospitalization    History of Present Illness  The patient is a 35-year-old male presenting today after a recent hospitalization at [facility] from 2025 through 2025 for acute kidney injury. He has a permacath in his right chest. His insurance does not cover the vascular surgeon he saw at [facility]. He was advised to ask today about a referral elsewhere to manage vascular access. He does have a follow-up via hemodialysis rounds with nephrology soon.    Past Medical, Surgical, Social, and Family History:  Allergies   Allergen Reactions    Sulfa Antibiotics Unknown - Low Severity      Past Medical History:   Diagnosis Date    Kidney stone       Past Surgical History:   Procedure Laterality Date    TONSILLECTOMY        Social History     Socioeconomic History    Marital status: Single   Tobacco Use    Smoking status: Former     Current packs/day: 0.00     Average packs/day: 1.5 packs/day for 10.2 years (15.3 ttl pk-yrs)     Types: Cigarettes     Start date:      Quit date: 3/11/2025     Years since quittin.0     Passive exposure: Past    Smokeless tobacco: Never    Tobacco comments:     stopped 3 weeks ago.    Vaping Use    Vaping status: Never Used   Substance and Sexual Activity    Alcohol use: Not Currently     Comment: occasional    Drug use: Yes     Types: Marijuana     Comment: last use last week    Sexual activity: Yes     Partners: Female      Family History   Problem Relation Age of Onset    Diabetes Mother     Heart disease Father     Diabetes Paternal Uncle     Diabetes Maternal Aunt        Objective   Vital Signs  /68   Pulse 80   Temp 97.2 °F (36.2 °C) (Infrared)   Ht 170.2 cm (67.01\")   Wt 80.1 kg (176 lb 9.6 oz)   SpO2 99%   BMI 27.65 kg/m²    Physical Exam  Constitutional:       General: He is not in acute distress.  Cardiovascular:      Rate and Rhythm: Normal rate and regular rhythm.      Pulses: Normal pulses.      " Heart sounds: No murmur heard.     No friction rub. No gallop.   Pulmonary:      Effort: Pulmonary effort is normal. No respiratory distress.      Breath sounds: Normal breath sounds. No wheezing or rhonchi.   Neurological:      Mental Status: He is alert.       Assessment & Plan   Assessment & Plan  1. Acute kidney injury.  He was recently hospitalized from 3/20/2025 to 3/25/2025 for acute kidney injury. He has a permacath in his right chest. He is advised to continue follow-ups with nephrology, including hemodialysis rounds.    2. Encounter for care related to vascular access port.  His insurance does not cover the vascular surgeon he saw at [facility]. An ambulatory referral to a vascular specialist will be placed to manage the vascular access port.    Follow-up:  The patient will Return for follow-up as needed.    Records and Results Reviewed:  I reviewed current medications as given by patient and allergy list.    : [employer]- and Dragon Speech Recognition - No recording technology was used in the exam room during encounter.    Electronically signed by PEACE Cohn, 04/01/25, 12:32 PM CDT.

## (undated) DEVICE — SHEET,T,THYROID,STERILE: Brand: MEDLINE

## (undated) DEVICE — CATHETER KIT 5 FR 21 GAX7 CM MICROINTRODUCER GUIDEWIRE STIFF

## (undated) DEVICE — STANDARD HYPODERMIC NEEDLE,POLYPROPYLENE HUB: Brand: MONOJECT

## (undated) DEVICE — CATHETER HD STR 14.5 FRX24 CM 19 CM STD LT KT STYL GLIDEPATH - ORDER UOM CA

## (undated) DEVICE — DRAPE C ARM W42XL120IN W POLY STRP W OUT LENS

## (undated) DEVICE — MINOR CDS: Brand: MEDLINE INDUSTRIES, INC.

## (undated) DEVICE — DRAPE,UTILITY,XL,4/PK,STERILE: Brand: MEDLINE

## (undated) DEVICE — SENSOR PLSE OXMTR AD CBL L3FT ADH TRANSMISSIVE

## (undated) DEVICE — PENCIL BTTN S S CAUT TIP W HOLSTER 25 50

## (undated) DEVICE — TOWEL,OR,DSP,ST,BLUE,DLX,4/PK,20PK/CS: Brand: MEDLINE

## (undated) DEVICE — GLOVE SURG SZ 7 L12IN FNGR THK79MIL GRN LTX FREE

## (undated) DEVICE — LIQUIBAND RAPID ADHESIVE 36/CS 0.8ML: Brand: MEDLINE

## (undated) DEVICE — DECANTER FLD 9IN ST BG FOR ASEP TRNSF OF FLD

## (undated) DEVICE — SUTURE N ABSRB MONOFILAMENT 2-0 FSLX 75 CM 36 MM ETHILON

## (undated) DEVICE — SUTURE VICRYL + SZ 3-0 L27IN ABSRB UD L26MM SH 1/2 CIR VCP416H

## (undated) DEVICE — SOLUTION IV 250ML 0.9% SOD CHL PH 5 INJ USP VIAFLX PLAS

## (undated) DEVICE — SUTURE ETHILON SZ 3-0 L18IN NONABSORBABLE BLK L24MM FS-1 3/8 663G